# Patient Record
Sex: FEMALE | Race: WHITE | NOT HISPANIC OR LATINO | URBAN - METROPOLITAN AREA
[De-identification: names, ages, dates, MRNs, and addresses within clinical notes are randomized per-mention and may not be internally consistent; named-entity substitution may affect disease eponyms.]

---

## 2019-02-21 ENCOUNTER — INPATIENT (INPATIENT)
Facility: HOSPITAL | Age: 79
LOS: 1 days | Discharge: HOME | End: 2019-02-23
Attending: INTERNAL MEDICINE | Admitting: INTERNAL MEDICINE

## 2019-02-21 VITALS
OXYGEN SATURATION: 96 % | SYSTOLIC BLOOD PRESSURE: 131 MMHG | TEMPERATURE: 97 F | DIASTOLIC BLOOD PRESSURE: 67 MMHG | RESPIRATION RATE: 20 BRPM | HEART RATE: 64 BPM

## 2019-02-21 DIAGNOSIS — Z98.890 OTHER SPECIFIED POSTPROCEDURAL STATES: Chronic | ICD-10-CM

## 2019-02-21 LAB
ALBUMIN SERPL ELPH-MCNC: 4.5 G/DL — SIGNIFICANT CHANGE UP (ref 3.5–5.2)
ALP SERPL-CCNC: 77 U/L — SIGNIFICANT CHANGE UP (ref 30–115)
ALT FLD-CCNC: 16 U/L — SIGNIFICANT CHANGE UP (ref 0–41)
ANION GAP SERPL CALC-SCNC: 16 MMOL/L — HIGH (ref 7–14)
APPEARANCE UR: ABNORMAL
AST SERPL-CCNC: 18 U/L — SIGNIFICANT CHANGE UP (ref 0–41)
BACTERIA # UR AUTO: ABNORMAL /HPF
BASOPHILS # BLD AUTO: 0.03 K/UL — SIGNIFICANT CHANGE UP (ref 0–0.2)
BASOPHILS NFR BLD AUTO: 0.3 % — SIGNIFICANT CHANGE UP (ref 0–1)
BILIRUB SERPL-MCNC: 1.4 MG/DL — HIGH (ref 0.2–1.2)
BILIRUB UR-MCNC: NEGATIVE — SIGNIFICANT CHANGE UP
BUN SERPL-MCNC: 15 MG/DL — SIGNIFICANT CHANGE UP (ref 10–20)
CALCIUM SERPL-MCNC: 10.2 MG/DL — HIGH (ref 8.5–10.1)
CHLORIDE SERPL-SCNC: 98 MMOL/L — SIGNIFICANT CHANGE UP (ref 98–110)
CO2 SERPL-SCNC: 22 MMOL/L — SIGNIFICANT CHANGE UP (ref 17–32)
COLOR SPEC: ABNORMAL
CREAT SERPL-MCNC: 1.4 MG/DL — SIGNIFICANT CHANGE UP (ref 0.7–1.5)
DIFF PNL FLD: ABNORMAL
EOSINOPHIL # BLD AUTO: 0.02 K/UL — SIGNIFICANT CHANGE UP (ref 0–0.7)
EOSINOPHIL NFR BLD AUTO: 0.2 % — SIGNIFICANT CHANGE UP (ref 0–8)
EPI CELLS # UR: ABNORMAL /HPF
GLUCOSE SERPL-MCNC: 115 MG/DL — HIGH (ref 70–99)
GLUCOSE UR QL: NEGATIVE MG/DL — SIGNIFICANT CHANGE UP
HCT VFR BLD CALC: 40.3 % — SIGNIFICANT CHANGE UP (ref 37–47)
HGB BLD-MCNC: 13.8 G/DL — SIGNIFICANT CHANGE UP (ref 12–16)
IMM GRANULOCYTES NFR BLD AUTO: 0.5 % — HIGH (ref 0.1–0.3)
KETONES UR-MCNC: NEGATIVE — SIGNIFICANT CHANGE UP
LACTATE SERPL-SCNC: 1.6 MMOL/L — SIGNIFICANT CHANGE UP (ref 0.5–2.2)
LEUKOCYTE ESTERASE UR-ACNC: ABNORMAL
LIDOCAIN IGE QN: 138 U/L — HIGH (ref 7–60)
LYMPHOCYTES # BLD AUTO: 0.71 K/UL — LOW (ref 1.2–3.4)
LYMPHOCYTES # BLD AUTO: 6.9 % — LOW (ref 20.5–51.1)
MAGNESIUM SERPL-MCNC: 2 MG/DL — SIGNIFICANT CHANGE UP (ref 1.8–2.4)
MCHC RBC-ENTMCNC: 27.4 PG — SIGNIFICANT CHANGE UP (ref 27–31)
MCHC RBC-ENTMCNC: 34.2 G/DL — SIGNIFICANT CHANGE UP (ref 32–37)
MCV RBC AUTO: 80.1 FL — LOW (ref 81–99)
MONOCYTES # BLD AUTO: 0.51 K/UL — SIGNIFICANT CHANGE UP (ref 0.1–0.6)
MONOCYTES NFR BLD AUTO: 4.9 % — SIGNIFICANT CHANGE UP (ref 1.7–9.3)
NEUTROPHILS # BLD AUTO: 9.03 K/UL — HIGH (ref 1.4–6.5)
NEUTROPHILS NFR BLD AUTO: 87.2 % — HIGH (ref 42.2–75.2)
NITRITE UR-MCNC: POSITIVE
NRBC # BLD: 0 /100 WBCS — SIGNIFICANT CHANGE UP (ref 0–0)
PH UR: 7 — SIGNIFICANT CHANGE UP (ref 5–8)
PLATELET # BLD AUTO: 185 K/UL — SIGNIFICANT CHANGE UP (ref 130–400)
POTASSIUM SERPL-MCNC: 3.9 MMOL/L — SIGNIFICANT CHANGE UP (ref 3.5–5)
POTASSIUM SERPL-SCNC: 3.9 MMOL/L — SIGNIFICANT CHANGE UP (ref 3.5–5)
PROT SERPL-MCNC: 7.2 G/DL — SIGNIFICANT CHANGE UP (ref 6–8)
PROT UR-MCNC: ABNORMAL MG/DL
RBC # BLD: 5.03 M/UL — SIGNIFICANT CHANGE UP (ref 4.2–5.4)
RBC # FLD: 12.9 % — SIGNIFICANT CHANGE UP (ref 11.5–14.5)
RBC CASTS # UR COMP ASSIST: ABNORMAL /HPF
SODIUM SERPL-SCNC: 136 MMOL/L — SIGNIFICANT CHANGE UP (ref 135–146)
SP GR SPEC: 1.01 — SIGNIFICANT CHANGE UP (ref 1.01–1.03)
UROBILINOGEN FLD QL: 1 MG/DL (ref 0.2–0.2)
WBC # BLD: 10.35 K/UL — SIGNIFICANT CHANGE UP (ref 4.8–10.8)
WBC # FLD AUTO: 10.35 K/UL — SIGNIFICANT CHANGE UP (ref 4.8–10.8)
WBC UR QL: ABNORMAL /HPF

## 2019-02-21 RX ORDER — SODIUM CHLORIDE 9 MG/ML
1000 INJECTION INTRAMUSCULAR; INTRAVENOUS; SUBCUTANEOUS
Qty: 0 | Refills: 0 | Status: DISCONTINUED | OUTPATIENT
Start: 2019-02-21 | End: 2019-02-23

## 2019-02-21 RX ORDER — MORPHINE SULFATE 50 MG/1
2 CAPSULE, EXTENDED RELEASE ORAL EVERY 4 HOURS
Qty: 0 | Refills: 0 | Status: DISCONTINUED | OUTPATIENT
Start: 2019-02-21 | End: 2019-02-23

## 2019-02-21 RX ORDER — ACETAMINOPHEN 500 MG
650 TABLET ORAL ONCE
Qty: 0 | Refills: 0 | Status: COMPLETED | OUTPATIENT
Start: 2019-02-21 | End: 2019-02-21

## 2019-02-21 RX ORDER — ONDANSETRON 8 MG/1
4 TABLET, FILM COATED ORAL EVERY 6 HOURS
Qty: 0 | Refills: 0 | Status: DISCONTINUED | OUTPATIENT
Start: 2019-02-21 | End: 2019-02-23

## 2019-02-21 RX ORDER — TAMSULOSIN HYDROCHLORIDE 0.4 MG/1
0.4 CAPSULE ORAL AT BEDTIME
Qty: 0 | Refills: 0 | Status: DISCONTINUED | OUTPATIENT
Start: 2019-02-21 | End: 2019-02-23

## 2019-02-21 RX ORDER — CHLORHEXIDINE GLUCONATE 213 G/1000ML
1 SOLUTION TOPICAL
Qty: 0 | Refills: 0 | Status: DISCONTINUED | OUTPATIENT
Start: 2019-02-21 | End: 2019-02-23

## 2019-02-21 RX ORDER — ACETAMINOPHEN 500 MG
650 TABLET ORAL EVERY 6 HOURS
Qty: 0 | Refills: 0 | Status: DISCONTINUED | OUTPATIENT
Start: 2019-02-21 | End: 2019-02-23

## 2019-02-21 RX ORDER — SODIUM CHLORIDE 9 MG/ML
1000 INJECTION, SOLUTION INTRAVENOUS ONCE
Qty: 0 | Refills: 0 | Status: COMPLETED | OUTPATIENT
Start: 2019-02-21 | End: 2019-02-21

## 2019-02-21 RX ORDER — HEPARIN SODIUM 5000 [USP'U]/ML
5000 INJECTION INTRAVENOUS; SUBCUTANEOUS EVERY 8 HOURS
Qty: 0 | Refills: 0 | Status: DISCONTINUED | OUTPATIENT
Start: 2019-02-21 | End: 2019-02-23

## 2019-02-21 RX ORDER — CEFTRIAXONE 500 MG/1
1 INJECTION, POWDER, FOR SOLUTION INTRAMUSCULAR; INTRAVENOUS EVERY 24 HOURS
Qty: 0 | Refills: 0 | Status: DISCONTINUED | OUTPATIENT
Start: 2019-02-21 | End: 2019-02-23

## 2019-02-21 RX ORDER — CEFTRIAXONE 500 MG/1
1 INJECTION, POWDER, FOR SOLUTION INTRAMUSCULAR; INTRAVENOUS ONCE
Qty: 0 | Refills: 0 | Status: COMPLETED | OUTPATIENT
Start: 2019-02-21 | End: 2019-02-21

## 2019-02-21 RX ADMIN — Medication 650 MILLIGRAM(S): at 19:02

## 2019-02-21 RX ADMIN — SODIUM CHLORIDE 2000 MILLILITER(S): 9 INJECTION, SOLUTION INTRAVENOUS at 19:02

## 2019-02-21 RX ADMIN — SODIUM CHLORIDE 1000 MILLILITER(S): 9 INJECTION, SOLUTION INTRAVENOUS at 20:21

## 2019-02-21 RX ADMIN — Medication 650 MILLIGRAM(S): at 20:21

## 2019-02-21 RX ADMIN — CEFTRIAXONE 100 GRAM(S): 500 INJECTION, POWDER, FOR SOLUTION INTRAMUSCULAR; INTRAVENOUS at 20:21

## 2019-02-21 NOTE — H&P ADULT - NSHPREVIEWOFSYSTEMS_GEN_ALL_CORE
REVIEW OF SYSTEMS:    CONSTITUTIONAL: No weakness or fevers  EYES/ENT: No visual changes  NECK: No pain or stiffness  RESPIRATORY: No cough, wheezing, hemoptysis; No shortness of breath  CARDIOVASCULAR: No chest pain or palpitations, no lower extremity edema  GASTROINTESTINAL: see HPI  GENITOURINARY: See hpi  NEUROLOGICAL: No numbness or weakness  SKIN: No itching, rashes

## 2019-02-21 NOTE — ED PROVIDER NOTE - ATTENDING CONTRIBUTION TO CARE
Pt is a 77yo female with nonradiating R flank pain that began yesterday but worsened today.  It is constant and occasionally worsens for 15min episodes.  Vomited once today.  No fever.  [place] [facility] [place] and [place] told [place] had a UTI and given Cipro and sent to ED.  No other complaints.    Exam: soft NT abdomen, R CVA tenderness, no rash, NAD, cap refill <2s  Imp: r/o stone or pyelo  Plan: labs, ua, ct

## 2019-02-21 NOTE — ED PROVIDER NOTE - PROGRESS NOTE DETAILS
Discussed with Kristel urology PA. Said patient cannot be discharged with positive urine and stone. Recommending admission to medicine. Does not believe she will be taken to OR tomorrow, but may need a stent at some point in the future depending on how stone and clinical picture is progressing.

## 2019-02-21 NOTE — H&P ADULT - ATTENDING COMMENTS
HPI as above.  Interval history: Pt denies any cp or sob, n/v, or pain  Vital Signs (24 Hrs):  T(C): 36.6 (02-22-19 @ 13:02), Max: 36.8 (02-22-19 @ 00:40)  HR: 80 (02-22-19 @ 13:02) (64 - 84)  BP: 130/65 (02-22-19 @ 13:02) (107/67 - 143/69)  RR: 18 (02-22-19 @ 13:02) (18 - 20)  SpO2: 95% (02-22-19 @ 00:40) (95% - 97%)  Wt(kg): --  Daily Height in cm: 157.48 (22 Feb 2019 06:30)    Daily     I&O's Summary    Exam: stable, no CVA tenderness   Labs reviewed  Imaging reviewed  < from: CT Abdomen and Pelvis No Cont (02.21.19 @ 19:06) >    IMPRESSION:    1.  Obstructing right proximal ureter calculus, 6.5 x 5.5 mm with   Hounsfield unit of 1156.  2.  Mild right hydronephrosis.    EKG reviewed    Plan  #Nephrolithiasis- fu urology, continue CTX, fu urine clx, pain control PRN  #ELIZABETH on CKD-base 0.9-->1.5, getting worse - send urine lytes, likely post obstructive given stone, continue fluids, DW urology, they will dw attending regarding possible intervention     #Progress Note Handoff  Pending (specify):  Consults_________, Tests________, Test Results_______, Other_________  Family discussion:  Disposition: Home___/SNF___/Other________/Unknown at this time________ HPI as above.  Interval history: Pt denies any cp or sob, n/v, or pain  Vital Signs (24 Hrs):  T(C): 36.6 (02-22-19 @ 13:02), Max: 36.8 (02-22-19 @ 00:40)  HR: 80 (02-22-19 @ 13:02) (64 - 84)  BP: 130/65 (02-22-19 @ 13:02) (107/67 - 143/69)  RR: 18 (02-22-19 @ 13:02) (18 - 20)  SpO2: 95% (02-22-19 @ 00:40) (95% - 97%)  Wt(kg): --  Daily Height in cm: 157.48 (22 Feb 2019 06:30)    Daily     I&O's Summary    Exam: stable, no CVA tenderness   Labs reviewed  Imaging reviewed  < from: CT Abdomen and Pelvis No Cont (02.21.19 @ 19:06) >    IMPRESSION:    1.  Obstructing right proximal ureter calculus, 6.5 x 5.5 mm with   Hounsfield unit of 1156.  2.  Mild right hydronephrosis.    EKG reviewed    Plan  #Nephrolithiasis- fu urology, continue CTX, fu urine clx, pain control PRN  #ELIZABETH on CKD 3a-base 0.9-->1.5, getting worse - send urine lytes, likely post obstructive given stone, continue fluids, DW urology, they will dw attending regarding possible intervention  #Elevated tbili- chronically elevated in past, CT scan neg for etioogy, continue to trend, INR stable, AST/ALT stable     #Progress Note Handoff  Pending (specify):  Consults__Urology follow up possible intervention today, pt npo_______, Tests________, Test Results_______, Other_________  Family discussion: dw pt at bedside.   Disposition: Home___/SNF___/Other________/Unknown at this time______x__

## 2019-02-21 NOTE — ED PROVIDER NOTE - OBJECTIVE STATEMENT
78 y f pmh R flank pain. Flank pain since yesterday, worsened today. Constant, radiating down towards RLQ. 1 episode nbnb vomiting today in ED. Seen at [facility] today and given cipro for UTI but told to come to ED. Denies fever, chills, nausea, cp, sob, headache, urinary sx.

## 2019-02-21 NOTE — H&P ADULT - NSHPPHYSICALEXAM_GEN_ALL_CORE
Vital Signs Last 24 Hrs  T(C): 36.2 (21 Feb 2019 18:14), Max: 36.2 (21 Feb 2019 18:14)  T(F): 97.1 (21 Feb 2019 18:14), Max: 97.1 (21 Feb 2019 18:14)  HR: 74 (21 Feb 2019 18:14) (64 - 74)  BP: 107/67 (21 Feb 2019 18:14) (107/67 - 131/67)  BP(mean): --  ABP: --  ABP(mean): --  RR: 20 (21 Feb 2019 18:14) (20 - 20)  SpO2: 97% (21 Feb 2019 18:14) (96% - 97%) Vital Signs Last 24 Hrs  T(C): 36.2 (21 Feb 2019 18:14), Max: 36.2 (21 Feb 2019 18:14)  T(F): 97.1 (21 Feb 2019 18:14), Max: 97.1 (21 Feb 2019 18:14)  HR: 74 (21 Feb 2019 18:14) (64 - 74)  BP: 107/67 (21 Feb 2019 18:14) (107/67 - 131/67)  RR: 20 (21 Feb 2019 18:14) (20 - 20)  SpO2: 97% (21 Feb 2019 18:14) (96% - 97%)    PHYSICAL EXAM:  GENERAL: NAD, speaks in full sentences, no signs of respiratory distress  HEAD:  Atraumatic, Normocephalic  EYES: EOMI, non-icteric, no injected sclera  NECK: Supple  CHEST/LUNG: Clear to auscultation bilaterally; No wheeze; No crackles; No accessory muscles used  HEART: Regular rate and rhythm; No murmurs;   ABDOMEN: Soft, Nontender, Nondistended; Bowel sounds present; No guarding; non-rigid; no CVA tenderness  EXTREMITIES:  no LE edema, moves all ext  PSYCH: AAOx3  NEUROLOGY: non-focal  SKIN: No rashes or lesions

## 2019-02-21 NOTE — H&P ADULT - ASSESSMENT
CT A/P - 1.  Obstructing right proximal ureter calculus, 6.5 x 5.5 mm with Hounsfield unit of 1156. 2.  Mild right hydronephrosis.    # Right Nephrolithiasis (6.5 x 5.5 mm) & Mild Rt Hydronephrosis + UA & ELIZABETH on CKD3a (Cr baseline 0.94 11/2018)  - No Leukocytosis, No fever, No lactic acidosis - sepsis r/o on admission  - Urology following  - IV fluid hydration  - PRN analgesics  - Flomax  - IV Abx  - F/u Ur + Bld Cx    CHG  DVT ppx 78/F CKD 3a? presents to ED with right flank pain - found to haev +UA & Right Nephrolithiasis (6.5 x 5.5 mm) & Mild Rt Hydronephrosis. Admit to medicine.    CT A/P - 1.  Obstructing right proximal ureter calculus, 6.5 x 5.5 mm with Hounsfield unit of 1156. 2.  Mild right hydronephrosis.    # Right Nephrolithiasis (6.5 x 5.5 mm) & Mild Rt Hydronephrosis + UA & ELIZABETH on CKD3a (Cr baseline 0.94 11/2018)  - No Leukocytosis, No fever, No lactic acidosis - sepsis r/o on admission  - No hx renal stone or known hx of fmaily with renal stone; no fad diets; no hypercalcemia (corrected for albumin 4.5); endorses poor fluid intake (at baseline)  - Urology following  - IV fluid hydration  - PRN analgesics - avoid NSAIDs in view of ELIZABETH, pt refused to take morphine - improved with tylenol, will c/w tylenol PRN  - Flomax  - IV Abx  - F/u Ur + Bld Cx  - Urine studies  - PRN zofran  - Counceled on driniking 2L fluid per day    # HTN - pt on norvasc 10mg; hold for now, if BP elevated restart  # Lipase - 130s, no evidence of acute pancreatitis based on hx + physical exam; Pancreas unremarkable on CT a/p    CHG  DVT ppx  DASH diet  OOB

## 2019-02-21 NOTE — CONSULT NOTE ADULT - ASSESSMENT
79 yo female with right renal colic, stone is proximal and ua + for nitrites and leukocytes, pt non toxic and afebrile    plan  -admit to medicine   -abx   -ucx  -IVF   -Flomax  -analgesia  -antiemetics     If pt develops intractable pain or fever/chills, pt may need ureteral stent    case d/w Dr. Barbosa

## 2019-02-21 NOTE — ED ADULT NURSE NOTE - NSIMPLEMENTINTERV_GEN_ALL_ED
Implemented All Fall with Harm Risk Interventions:  [place] to call system. Call bell, personal items and telephone within reach. Instruct patient to call for assistance. Room bathroom lighting operational. Non-slip footwear when patient is off stretcher. Physically safe environment: no spills, clutter or unnecessary equipment. Stretcher in lowest position, wheels locked, appropriate side rails in place. Provide visual cue, wrist band, yellow gown, etc. Monitor gait and stability. Monitor for mental status changes and reorient to person, place, and time. Review medications for side effects contributing to fall risk. Reinforce activity limits and safety measures with patient and family. Provide visual clues: red socks.

## 2019-02-21 NOTE — H&P ADULT - HISTORY OF PRESENT ILLNESS
78/F presents to ED with right flank pain.  She went to urgent care today and  was given ciprofloxacin for possible UTI and was advised to go to ED for further evaluation.     Rt flank pain started 1 day PTP, but worse today. The pain is constant, w/radiation down towards her right lower abdomen.    She had 1 episode nbnb vomiting while in ED. 78/F presents to ED with right flank pain. She went to urgent care today and was given ciprofloxacin for possible UTI. She then called her PMD today bc the pain to her right flank was getting worse and was advised to go to ED for further evaluation.     Rt flank pain started 1 day PTP,sharp, was 9/10, constant, radiation to right groin. No provoking symptoms. Took 1 aspirin at home - did not help. Took tylenol in ED whcih helpted. She had 1 episode nbnb vomiting while in ED.    No fever, rash, difficulty urinating, pain or burning with urination. No other OTC medication other than 1 aspirin. She took 1 dose [place] [facility] [place] No chest pain or SOB.

## 2019-02-21 NOTE — ED PROVIDER NOTE - CLINICAL SUMMARY MEDICAL DECISION MAKING FREE TEXT BOX
pt with sudden onset flank pain - noted to have UTI and given Cipro - UTI with stone - admitted for possible septic stone - no emergent surgical intervention

## 2019-02-21 NOTE — ED PROVIDER NOTE - PHYSICAL EXAMINATION
CONSTITUTIONAL: Well-developed; well-nourished; in no acute distress.   SKIN: warm, dry  HEAD: Normocephalic; atraumatic.  EYES: normal sclera and conjunctiva   ENT: No nasal discharge; airway clear.  NECK: Supple; non tender.  CARD: S1, S2 normal; no murmurs, gallops, or rubs. Regular rate and rhythm.   RESP: No wheezes, rales or rhonchi.  ABD: Soft, nondistended. R cva tenderness, RLQ tenderness with no rebound/guarding.   EXT: Normal ROM.  No clubbing, cyanosis or edema.   LYMPH: No acute cervical adenopathy.  NEURO: Alert, oriented, grossly unremarkable  PSYCH: Cooperative, appropriate.

## 2019-02-21 NOTE — ED PROVIDER NOTE - NS ED ROS FT
Eyes:  No visual changes, eye pain or discharge.  ENMT:  No hearing changes, pain, discharge or infections. No neck pain or stiffness.  Cardiac:  No chest pain, SOB or edema.   Respiratory:  No cough or respiratory distress.  GI:  R flank pain, R sided abd pain. 1 episode nbnb vomiting. No diarrhea   :  No dysuria, frequency or burning.  MS:  No myalgia, muscle weakness, joint pain or back pain.  Neuro:  No headache or weakness.  No LOC.  Skin:  No skin rash.   Endocrine: No history of thyroid disease or diabetes.

## 2019-02-21 NOTE — CONSULT NOTE ADULT - SUBJECTIVE AND OBJECTIVE BOX
77 yo female c/o severe right flank/abdominal pain since this morning with associated nausea and vomiting. Pt went to [facility], had +ua was prescribed Cipro, which she took 1 pill so far. Pt developed severe pain again in the area of the right flank/abdomen, +n/v, pt went to [facility] to see her PMD Dr. Mccoy, he sent her to ED for CT a/p to r/o appendicitis or renal colic. Pt was found to have a [id number] right proximal stone with mild hydronephrosis, 79 yo female c/o severe right flank/abdominal pain since this morning with associated nausea and vomiting. Pt went to [facility], had +ua was prescribed Cipro, which she took 1 pill so far. Pt developed severe pain again in the area of the right flank/abdomen, +n/v, pt went to [facility] to see her PMD Dr. Mccoy, he sent her to ED for CT a/p to r/o appendicitis or renal colic. Pt was found to have a [id number] right proximal stone with mild hydronephrosis, although afebrile, pt has nitrite and leukocyte positive urinalysis    PAST MEDICAL & SURGICAL HISTORY:  HTN    Home Medications:  Norvasc    Allergies    No Known Allergies    Intolerances    SHx - NC    FHx - NC    Vital Signs Last 24 Hrs  T(C): 36.2 (2019 18:14), Max: 36.2 (2019 18:14)  T(F): 97.1 (2019 18:14), Max: 97.1 (2019 18:14)  HR: 74 (2019 18:14) (64 - 74)  BP: 107/67 (2019 18:14) (107/67 - 131/67)  RR: 20 (2019 18:14) (20 - 20)  < from: CT Abdomen and Pelvis No Cont (19 @ 19:06) >  IMPRESSION:    1.  Obstructing right proximal ureter calculus, 6.5 x 5.5 mm with   Hounsfield unit of 1156.  2.  Mild right hydronephrosis.        < end of copied text >  SpO2: 97% (2019 18:14) (96% - 97%)    pt seen and examined at bedside  a+ox3, nad, non toxic  abd - soft, nd, +rlq ttp, no palpable bladder  gu- +right cvat, no Joseph                          13.8   10.35 )-----------( 185      ( 2019 17:05 )             40.3         136  |  98  |  15  ----------------------------<  115<H>  3.9   |  22  |  1.4    Ca    10.2<H>      2019 17:05  Mg     2.0         TPro  7.2  /  Alb  4.5  /  TBili  1.4<H>  /  DBili  x   /  AST  18  /  ALT  16  /  AlkPhos  77      Urinalysis Basic - ( 2019 19:00 )    Color: Orange / Appearance: Cloudy / S.010 / pH: x  Gluc: x / Ketone: Negative  / Bili: Negative / Urobili: 1.0 mg/dL   Blood: x / Protein: Trace mg/dL / Nitrite: Positive   Leuk Esterase: Moderate / RBC: 26-50 /HPF / WBC 26-50 /HPF   Sq Epi: x / Non Sq Epi: Many /HPF / Bacteria: Few /HPF    < from: CT Abdomen and Pelvis No Cont (19 @ 19:06) >  IMPRESSION:    1.  Obstructing right proximal ureter calculus, 6.5 x 5.5 mm with   Hounsfield unit of 1156.  2.  Mild right hydronephrosis.

## 2019-02-21 NOTE — H&P ADULT - NSHPLABSRESULTS_GEN_ALL_CORE
13.8   10.35 )-----------( 185      ( 2019 17:05 )             40.3       Hemoglobin: 13.8 g/dL ( @ 17:05)          136  |  98  |  15  ----------------------------<  115<H>  3.9   |  22  |  1.4    Ca    10.2<H>      2019 17:05  Mg     2.0         TPro  7.2  /  Alb  4.5  /  TBili  1.4<H>  /  DBili  x   /  AST  18  /  ALT  16  /  AlkPhos  77          Creatinine Trend: 1.4<--  eGFR if Non African American: 36 mL/min/1.73M2 (19 @ 17:05)  eGFR if African American: 42 mL/min/1.73M2 (19 @ 17:05)    Urinalysis Basic - ( 2019 19:00 )    Color: Orange / Appearance: Cloudy / S.010 / pH: x  Gluc: x / Ketone: Negative  / Bili: Negative / Urobili: 1.0 mg/dL   Blood: x / Protein: Trace mg/dL / Nitrite: Positive   Leuk Esterase: Moderate / RBC: 26-50 /HPF / WBC 26-50 /HPF   Sq Epi: x / Non Sq Epi: Many /HPF / Bacteria: Few /HPF    Hemoglobin A1C         Lactate Trend   @ 17:05 Lactate:1.6

## 2019-02-22 DIAGNOSIS — N20.0 CALCULUS OF KIDNEY: ICD-10-CM

## 2019-02-22 LAB
ALBUMIN SERPL ELPH-MCNC: 4.5 G/DL — SIGNIFICANT CHANGE UP (ref 3.5–5.2)
ALP SERPL-CCNC: 76 U/L — SIGNIFICANT CHANGE UP (ref 30–115)
ALT FLD-CCNC: 15 U/L — SIGNIFICANT CHANGE UP (ref 0–41)
ANION GAP SERPL CALC-SCNC: 12 MMOL/L — SIGNIFICANT CHANGE UP (ref 7–14)
APTT BLD: 31.1 SEC — SIGNIFICANT CHANGE UP (ref 27–39.2)
AST SERPL-CCNC: 15 U/L — SIGNIFICANT CHANGE UP (ref 0–41)
BILIRUB SERPL-MCNC: 2 MG/DL — HIGH (ref 0.2–1.2)
BUN SERPL-MCNC: 14 MG/DL — SIGNIFICANT CHANGE UP (ref 10–20)
CALCIUM SERPL-MCNC: 9.7 MG/DL — SIGNIFICANT CHANGE UP (ref 8.5–10.1)
CHLORIDE SERPL-SCNC: 106 MMOL/L — SIGNIFICANT CHANGE UP (ref 98–110)
CO2 SERPL-SCNC: 24 MMOL/L — SIGNIFICANT CHANGE UP (ref 17–32)
CREAT SERPL-MCNC: 1.5 MG/DL — SIGNIFICANT CHANGE UP (ref 0.7–1.5)
GLUCOSE SERPL-MCNC: 97 MG/DL — SIGNIFICANT CHANGE UP (ref 70–99)
HCT VFR BLD CALC: 41.2 % — SIGNIFICANT CHANGE UP (ref 37–47)
HGB BLD-MCNC: 13.7 G/DL — SIGNIFICANT CHANGE UP (ref 12–16)
INR BLD: 1.04 RATIO — SIGNIFICANT CHANGE UP (ref 0.65–1.3)
MAGNESIUM SERPL-MCNC: 2.3 MG/DL — SIGNIFICANT CHANGE UP (ref 1.8–2.4)
MCHC RBC-ENTMCNC: 27.1 PG — SIGNIFICANT CHANGE UP (ref 27–31)
MCHC RBC-ENTMCNC: 33.3 G/DL — SIGNIFICANT CHANGE UP (ref 32–37)
MCV RBC AUTO: 81.4 FL — SIGNIFICANT CHANGE UP (ref 81–99)
NRBC # BLD: 0 /100 WBCS — SIGNIFICANT CHANGE UP (ref 0–0)
PHOSPHATE SERPL-MCNC: 3.6 MG/DL — SIGNIFICANT CHANGE UP (ref 2.1–4.9)
PLATELET # BLD AUTO: 173 K/UL — SIGNIFICANT CHANGE UP (ref 130–400)
POTASSIUM SERPL-MCNC: 3.7 MMOL/L — SIGNIFICANT CHANGE UP (ref 3.5–5)
POTASSIUM SERPL-SCNC: 3.7 MMOL/L — SIGNIFICANT CHANGE UP (ref 3.5–5)
PROT SERPL-MCNC: 7.2 G/DL — SIGNIFICANT CHANGE UP (ref 6–8)
PROTHROM AB SERPL-ACNC: 12 SEC — SIGNIFICANT CHANGE UP (ref 9.95–12.87)
RBC # BLD: 5.06 M/UL — SIGNIFICANT CHANGE UP (ref 4.2–5.4)
RBC # FLD: 13.2 % — SIGNIFICANT CHANGE UP (ref 11.5–14.5)
SODIUM SERPL-SCNC: 142 MMOL/L — SIGNIFICANT CHANGE UP (ref 135–146)
TYPE + AB SCN PNL BLD: SIGNIFICANT CHANGE UP
WBC # BLD: 5.23 K/UL — SIGNIFICANT CHANGE UP (ref 4.8–10.8)
WBC # FLD AUTO: 5.23 K/UL — SIGNIFICANT CHANGE UP (ref 4.8–10.8)

## 2019-02-22 RX ORDER — AMLODIPINE BESYLATE 2.5 MG/1
5 TABLET ORAL DAILY
Qty: 0 | Refills: 0 | Status: DISCONTINUED | OUTPATIENT
Start: 2019-02-22 | End: 2019-02-23

## 2019-02-22 RX ADMIN — Medication 650 MILLIGRAM(S): at 00:32

## 2019-02-22 RX ADMIN — Medication 650 MILLIGRAM(S): at 00:02

## 2019-02-22 RX ADMIN — CEFTRIAXONE 100 GRAM(S): 500 INJECTION, POWDER, FOR SOLUTION INTRAMUSCULAR; INTRAVENOUS at 08:58

## 2019-02-22 RX ADMIN — SODIUM CHLORIDE 150 MILLILITER(S): 9 INJECTION INTRAMUSCULAR; INTRAVENOUS; SUBCUTANEOUS at 01:00

## 2019-02-22 RX ADMIN — AMLODIPINE BESYLATE 5 MILLIGRAM(S): 2.5 TABLET ORAL at 11:26

## 2019-02-22 RX ADMIN — SODIUM CHLORIDE 150 MILLILITER(S): 9 INJECTION INTRAMUSCULAR; INTRAVENOUS; SUBCUTANEOUS at 08:58

## 2019-02-22 RX ADMIN — SODIUM CHLORIDE 150 MILLILITER(S): 9 INJECTION INTRAMUSCULAR; INTRAVENOUS; SUBCUTANEOUS at 21:22

## 2019-02-22 RX ADMIN — TAMSULOSIN HYDROCHLORIDE 0.4 MILLIGRAM(S): 0.4 CAPSULE ORAL at 21:23

## 2019-02-22 NOTE — PROGRESS NOTE ADULT - ASSESSMENT
79 y/o Female with right mild hydronephrosis 2/2  to a 5x5x6.5 proximal ureteral stone. Pt doing better this am.  No pain today.    A) right mild hydronephrosis 2/2 to a 5x5x6.5 proximal  ureteral stone  Non clean catch urinalysis  UTI    P) continue conservative management for now.  Flomax, pain control  strain all urine, IV hydration.  check culture, trend creatine.  will d/w attending

## 2019-02-22 NOTE — PROGRESS NOTE ADULT - PROBLEM SELECTOR PLAN 1
Patient seen.  Totally asymptomatic.  Did not pass stone yet.  I offered her options including cystoscopy with ureteroscopy, expectant management and ESWL.  She wants to undergo ESWL as outpatient.  May discharge on tamsulosin and pain medications.  Will schedule as outpatient.  Avoid ASA, NSAIDs etc.

## 2019-02-22 NOTE — PROGRESS NOTE ADULT - SUBJECTIVE AND OBJECTIVE BOX
Progress Note    Subjective  77 y/o Female with right mild hydronephrosis 2/2  to a 5x5x6.5 proximal ureteral stone. Pt doing better this am.  No pain today.      Vital signs  T(C): , Max: 36.8 (02-22-19 @ 00:40)  HR: 66 (02-22-19 @ 06:30)  BP: 143/69 (02-22-19 @ 06:30)  SpO2: 95% (02-22-19 @ 00:40)    Gen in NAD  Abd soft non tender, No CVAT   voiding    Labs                        13.7   5.23  )-----------( 173      ( 22 Feb 2019 07:39 )             41.2     22 Feb 2019 07:39    142    |  106    |  14     ----------------------------<  97     3.7     |  24     |  1.5      Ca    9.7        22 Feb 2019 07:39  Phos  3.6       22 Feb 2019 07:39  Mg     2.3       22 Feb 2019 07:39

## 2019-02-22 NOTE — PROGRESS NOTE ADULT - ASSESSMENT
78/F CKD 3a? presents to ED with right flank pain - found to haev +UA & Right Nephrolithiasis (6.5 x 5.5 mm) & Mild Rt Hydronephrosis.      # Right Nephrolithiasis (6.5 x 5.5 mm) & Mild Rt Hydronephrosis with +UA & ELIZABETH on CKD3a (Cr baseline 0.94 11/2018)  - No Leukocytosis, No fever, No lactic acidosis - sepsis r/o on admission  - CT A/P - 1.  Obstructing right proximal ureter calculus, 6.5 x 5.5 mm with Hounsfield unit of 1156. 2.  Mild right hydronephrosis.  - No hx renal stone or known hx of fmaily with renal stone; no fad diets; no hypercalcemia (corrected for albumin 4.5); endorses poor fluid intake (at baseline)  - Urology following: recommend conservative management for now. will follow.  - IV fluid hydration   - PRN analgesics - avoid NSAIDs in view of ELIZABETH, pt refused to take morphine - improved with tylenol, will c/w tylenol PRN  - Flomax  - IV Rocephin   - F/u Ur + Bld Cx  - Urine studies  - PRN zofran  - Counseled on drinking 2L fluid per day    # HTN - c/w Norvasc 10mg daily  # Lipase - 130s, no evidence of acute pancreatitis based on hx + physical exam; Pancreas unremarkable on CT a/p 78/F CKD 3a? presents to ED with right flank pain - found to haev +UA & Right Nephrolithiasis (6.5 x 5.5 mm) & Mild Rt Hydronephrosis.      # Right Nephrolithiasis (6.5 x 5.5 mm) & Mild Rt Hydronephrosis with +UA & ELIZABETH on CKD3a (Cr baseline 0.94 11/2018)  - No Leukocytosis, No fever, No lactic acidosis - sepsis r/o on admission  - CT A/P - 1.  Obstructing right proximal ureter calculus, 6.5 x 5.5 mm with Hounsfield unit of 1156. 2.  Mild right hydronephrosis.  - No hx renal stone or known hx of fmaily with renal stone; no fad diets; no hypercalcemia (corrected for albumin 4.5); endorses poor fluid intake (at baseline)  - Urology following: recommend conservative management for now. will follow.  - IV fluid hydration   - PRN analgesics - avoid NSAIDs in view of ELIZABETH, pt refused to take morphine - improved with tylenol, will c/w tylenol PRN  - Flomax  - IV Rocephin for UTI  - F/u Ur + Bld Cx  - Urine studies  - PRN zofran  - Counseled on drinking 2L fluid per day    # HTN - c/w Norvasc 10mg daily  # Lipase - 130s, no evidence of acute pancreatitis based on hx + physical exam; Pancreas unremarkable on CT a/p

## 2019-02-22 NOTE — PROGRESS NOTE ADULT - SUBJECTIVE AND OBJECTIVE BOX
SUBJECTIVE:    Patient is a 78y old Female who presents with a chief complaint of Nephrolithiasis (2019 22:06)    Currently admitted to medicine with the primary diagnosis of Renal stone     Today is hospital day 1d. pt c/o mild flank pain. encouraged oral hydration.    PAST MEDICAL & SURGICAL HISTORY  Hypertension  History of back surgery: age 28 - disc herniation surgery        ALLERGIES:  No Known Allergies    MEDICATIONS:  STANDING MEDICATIONS  amLODIPine   Tablet 5 milliGRAM(s) Oral daily  cefTRIAXone   IVPB 1 Gram(s) IV Intermittent every 24 hours  chlorhexidine 2% Cloths 1 Application(s) Topical <User Schedule>  heparin  Injectable 5000 Unit(s) SubCutaneous every 8 hours  sodium chloride 0.9%. 1000 milliLiter(s) IV Continuous <Continuous>  tamsulosin 0.4 milliGRAM(s) Oral at bedtime    PRN MEDICATIONS  acetaminophen   Tablet .. 650 milliGRAM(s) Oral every 6 hours PRN  morphine  - Injectable 2 milliGRAM(s) IV Push every 4 hours PRN  ondansetron Injectable 4 milliGRAM(s) IV Push every 6 hours PRN    VITALS:   T(F): 96.3  HR: 66  BP: 143/69  RR: 18  SpO2: 95%    LABS:                        13.7   5.23  )-----------( 173      ( 2019 07:39 )             41.2     02-    142  |  106  |  14  ----------------------------<  97  3.7   |  24  |  1.5    Ca    9.7      2019 07:39  Phos  3.6     -  Mg     2.3     -    TPro  7.2  /  Alb  4.5  /  TBili  2.0<H>  /  DBili  x   /  AST  15  /  ALT  15  /  AlkPhos  76  02-22    PT/INR - ( 2019 07:39 )   PT: 12.00 sec;   INR: 1.04 ratio         PTT - ( 2019 07:39 )  PTT:31.1 sec  Urinalysis Basic - ( 2019 19:00 )    Color: Orange / Appearance: Cloudy / S.010 / pH: x  Gluc: x / Ketone: Negative  / Bili: Negative / Urobili: 1.0 mg/dL   Blood: x / Protein: Trace mg/dL / Nitrite: Positive   Leuk Esterase: Moderate / RBC: 26-50 /HPF / WBC 26-50 /HPF   Sq Epi: x / Non Sq Epi: Many /HPF / Bacteria: Few /HPF        Lactate, Blood: 1.6 mmol/L (19 @ 17:05)          RADIOLOGY:      EXAM:  CT ABDOMEN AND PELVIS            PROCEDURE DATE:  2019            INTERPRETATION:  CLINICAL STATEMENT: Right flank pain      TECHNIQUE: Contiguous axial CT images were obtained from the lower chest   to the pubic symphysis without intravenous contrast.  Oral contrast was   not administered.  Reformatted images in the coronal and sagittal planes   were acquired.    COMPARISON CT: None.    OTHER STUDIES USED FOR CORRELATION: None.     FINDINGS:    LOWER CHEST: Coronary atherosclerosis.     HEPATOBILIARY: Unremarkable.    SPLEEN: Unremarkable.    PANCREAS: Unremarkable.    ADRENAL GLANDS: Unremarkable.    KIDNEYS: Mild right hydronephrosis secondary to obstructing right   proximal ureter calculus measuring 6.5 x 5.5 mm, image #139of the series   4 with a Hounsfield units of 1156. This calculus is seen between L2 and   L3 vertebral body.    ABDOMINOPELVIC NODES: Unremarkable.    PELVIC ORGANS: Unremarkable.    PERITONEUM/MESENTERY/BOWEL: Unremarkable.    BONES/SOFT TISSUES: Multilevel degenerative changes of spine.    OTHER: Atherosclerotic changes of abdominal aorta and the branches    IMPRESSION:    1.  Obstructing right proximal ureter calculus, 6.5 x 5.5 mm with   Hounsfield unit of 1156.  2.  Mild right hydronephrosis.               DENISSE PERALTA M.D., ATTENDING RADIOLOGIST  This document has been electronically signed. 2019  7:39PM                PHYSICAL EXAM:  GEN: No acute distress  LUNGS: Clear to auscultation bilaterally   HEART: S1/S2 present. RRR.   ABD: Soft, non-tender, non-distended. Bowel sounds present  EXT: Skin Intact.   NEURO: AAOX3

## 2019-02-23 ENCOUNTER — TRANSCRIPTION ENCOUNTER (OUTPATIENT)
Age: 79
End: 2019-02-23

## 2019-02-23 VITALS
TEMPERATURE: 97 F | SYSTOLIC BLOOD PRESSURE: 128 MMHG | HEART RATE: 77 BPM | RESPIRATION RATE: 18 BRPM | DIASTOLIC BLOOD PRESSURE: 62 MMHG

## 2019-02-23 LAB
ANION GAP SERPL CALC-SCNC: 18 MMOL/L — HIGH (ref 7–14)
BASOPHILS # BLD AUTO: 0.03 K/UL — SIGNIFICANT CHANGE UP (ref 0–0.2)
BASOPHILS NFR BLD AUTO: 0.6 % — SIGNIFICANT CHANGE UP (ref 0–1)
BUN SERPL-MCNC: 17 MG/DL — SIGNIFICANT CHANGE UP (ref 10–20)
CALCIUM SERPL-MCNC: 9.1 MG/DL — SIGNIFICANT CHANGE UP (ref 8.5–10.1)
CHLORIDE SERPL-SCNC: 104 MMOL/L — SIGNIFICANT CHANGE UP (ref 98–110)
CO2 SERPL-SCNC: 22 MMOL/L — SIGNIFICANT CHANGE UP (ref 17–32)
CREAT SERPL-MCNC: 1.2 MG/DL — SIGNIFICANT CHANGE UP (ref 0.7–1.5)
CULTURE RESULTS: NO GROWTH — SIGNIFICANT CHANGE UP
EOSINOPHIL # BLD AUTO: 0.11 K/UL — SIGNIFICANT CHANGE UP (ref 0–0.7)
EOSINOPHIL NFR BLD AUTO: 2.1 % — SIGNIFICANT CHANGE UP (ref 0–8)
GLUCOSE SERPL-MCNC: 96 MG/DL — SIGNIFICANT CHANGE UP (ref 70–99)
HCT VFR BLD CALC: 38.2 % — SIGNIFICANT CHANGE UP (ref 37–47)
HGB BLD-MCNC: 12.6 G/DL — SIGNIFICANT CHANGE UP (ref 12–16)
IMM GRANULOCYTES NFR BLD AUTO: 0.4 % — HIGH (ref 0.1–0.3)
LYMPHOCYTES # BLD AUTO: 1.22 K/UL — SIGNIFICANT CHANGE UP (ref 1.2–3.4)
LYMPHOCYTES # BLD AUTO: 23.1 % — SIGNIFICANT CHANGE UP (ref 20.5–51.1)
MCHC RBC-ENTMCNC: 27.2 PG — SIGNIFICANT CHANGE UP (ref 27–31)
MCHC RBC-ENTMCNC: 33 G/DL — SIGNIFICANT CHANGE UP (ref 32–37)
MCV RBC AUTO: 82.3 FL — SIGNIFICANT CHANGE UP (ref 81–99)
MONOCYTES # BLD AUTO: 0.44 K/UL — SIGNIFICANT CHANGE UP (ref 0.1–0.6)
MONOCYTES NFR BLD AUTO: 8.3 % — SIGNIFICANT CHANGE UP (ref 1.7–9.3)
NEUTROPHILS # BLD AUTO: 3.45 K/UL — SIGNIFICANT CHANGE UP (ref 1.4–6.5)
NEUTROPHILS NFR BLD AUTO: 65.5 % — SIGNIFICANT CHANGE UP (ref 42.2–75.2)
NRBC # BLD: 0 /100 WBCS — SIGNIFICANT CHANGE UP (ref 0–0)
PLATELET # BLD AUTO: 149 K/UL — SIGNIFICANT CHANGE UP (ref 130–400)
POTASSIUM SERPL-MCNC: 3.9 MMOL/L — SIGNIFICANT CHANGE UP (ref 3.5–5)
POTASSIUM SERPL-SCNC: 3.9 MMOL/L — SIGNIFICANT CHANGE UP (ref 3.5–5)
RBC # BLD: 4.64 M/UL — SIGNIFICANT CHANGE UP (ref 4.2–5.4)
RBC # FLD: 13.3 % — SIGNIFICANT CHANGE UP (ref 11.5–14.5)
SODIUM SERPL-SCNC: 144 MMOL/L — SIGNIFICANT CHANGE UP (ref 135–146)
SPECIMEN SOURCE: SIGNIFICANT CHANGE UP
WBC # BLD: 5.27 K/UL — SIGNIFICANT CHANGE UP (ref 4.8–10.8)
WBC # FLD AUTO: 5.27 K/UL — SIGNIFICANT CHANGE UP (ref 4.8–10.8)

## 2019-02-23 RX ORDER — CIPROFLOXACIN LACTATE 400MG/40ML
1 VIAL (ML) INTRAVENOUS
Qty: 16 | Refills: 0
Start: 2019-02-23 | End: 2019-03-02

## 2019-02-23 RX ORDER — TAMSULOSIN HYDROCHLORIDE 0.4 MG/1
1 CAPSULE ORAL
Qty: 30 | Refills: 0
Start: 2019-02-23 | End: 2019-03-24

## 2019-02-23 RX ADMIN — CEFTRIAXONE 100 GRAM(S): 500 INJECTION, POWDER, FOR SOLUTION INTRAMUSCULAR; INTRAVENOUS at 08:07

## 2019-02-23 RX ADMIN — Medication 650 MILLIGRAM(S): at 05:03

## 2019-02-23 RX ADMIN — AMLODIPINE BESYLATE 5 MILLIGRAM(S): 2.5 TABLET ORAL at 05:03

## 2019-02-23 RX ADMIN — Medication 650 MILLIGRAM(S): at 12:00

## 2019-02-23 RX ADMIN — Medication 650 MILLIGRAM(S): at 06:00

## 2019-02-23 RX ADMIN — Medication 650 MILLIGRAM(S): at 11:22

## 2019-02-23 NOTE — PROGRESS NOTE ADULT - ASSESSMENT
77 y/o Female with right mild hydronephrosis 2/2  to a 5x5x6.5 proximal ureteral stone. Pt with c/o mild flank pain  this am. Pain controlled with medication. No acute events overnight.      A) right mild hydronephrosis 2/2 to a 5x5x6.5 proximal  ureteral stone  Non clean catch urinalysis  UTI  resolving ELIZABETH  negative cultures      P) Pt is urologically cleared for d/c.  d/c home with Flomax  tramadol, strainers  she will f/u as op to be scheduled for ESWL  with Dr. Barbosa. [phone]  she is aware to return to the hospital if her pain worsens   or if she has a fever.

## 2019-02-23 NOTE — DISCHARGE NOTE ADULT - CARE PLAN
Principal Discharge DX:	Renal stone  Goal:	outpatient follow up  Assessment and plan of treatment:	You came in for a  6.5 x 5.5 mm right kidney stone. We treated you with IV fluids and antibiotics. Please follow up with urology with Dr. Barbosa. [phone] to undergo ESWL as outpatient. Avoid Aspirin and NSAIDS. Return to the ED if your pain worsens. Take meds as prescribed. Principal Discharge DX:	Renal stone  Goal:	outpatient follow up  Assessment and plan of treatment:	You came in for a  6.5 x 5.5 mm right kidney stone. We treated you with IV fluids and antibiotics. Please follow up with urology with Dr. Barbosa or Dr Do. [phone] to undergo ESWL as outpatient. Avoid Aspirin and NSAIDS. Return to the ED if your pain worsens. Take meds as prescribed. Finish the antibiotics you already have for another 7 days

## 2019-02-23 NOTE — DISCHARGE NOTE ADULT - MEDICATION SUMMARY - MEDICATIONS TO TAKE
I will START or STAY ON the medications listed below when I get home from the hospital:    tamsulosin 0.4 mg oral capsule  -- 1 cap(s) by mouth once a day (at bedtime)  -- Indication: For Kidney stone    Norvasc 10 mg oral tablet  -- 1 tab(s) by mouth once a day  -- Indication: For Hypertension    ciprofloxacin 500 mg oral tablet  -- 1 tab(s) by mouth 2 times a day until March 3 2019  -- Avoid prolonged or excessive exposure to direct and/or artificial sunlight while taking this medication.  Check with your doctor before becoming pregnant.  Do not take dairy products, antacids, or iron preparations within one hour of this medication.  Finish all this medication unless otherwise directed by prescriber.  Medication should be taken with plenty of water.    -- Indication: For Kidney stone

## 2019-02-23 NOTE — DISCHARGE NOTE ADULT - REASON FOR ADMISSION
Nephrolithiasis Nephrolithiasis with Ureteral Stone 6.5mm, ELIZABETH, Outpt Ureteroscopy/Lithotripsy and Stent Placement with .

## 2019-02-23 NOTE — DISCHARGE NOTE ADULT - PATIENT PORTAL LINK FT
You can access the [facility] Patient Portal, offered by [facility], by registering with the following website: [email or web address]

## 2019-02-23 NOTE — PROGRESS NOTE ADULT - SUBJECTIVE AND OBJECTIVE BOX
Progress Note    Subjective 77 y/o Female with right mild hydronephrosis 2/2  to a 5x5x6.5 proximal ureteral stone. Pt with c/o mild flank pain  this am. Pain controlled with medication. No acute events overnight.    Vital signs  T(C): , Max: 36.6 (02-22-19 @ 13:02)  HR: 60 (02-23-19 @ 05:11)  BP: 126/61 (02-23-19 @ 05:11)      Gen in NAD  Abd + right mild CVAT   voiding    Labs                        12.6   5.27  )-----------( 149      ( 23 Feb 2019 05:12 )             38.2     23 Feb 2019 05:12    144    |  104    |  17     ----------------------------<  96     3.9     |  22     |  1.2      Ca    9.1        23 Feb 2019 05:12  Phos  3.6       22 Feb 2019 07:39  Mg     2.3       22 Feb 2019 07:39        Culture - Blood (02.22.19 @ 00:18)    Specimen Source: .Blood None    Culture Results:   No growth to date.    Culture - Urine (02.21.19 @ 16:47)    Specimen Source: .Urine Clean Catch (Midstream)    Culture Results:   No growth

## 2019-02-23 NOTE — DISCHARGE NOTE ADULT - CARE PROVIDER_API CALL
Esvin Barbosa)  Urology  [facility], [place] [address]  Phone: [phone]  Fax: [phone]  Follow Up Time: Esvin Barbosa)  Urology  [facility], [place] [address]  Phone: [phone]  Fax: [phone]  Follow Up Time:     Sherif Do)  Urology  [address] [place] [address]  Phone: [phone]  Fax: [phone]  Follow Up Time:

## 2019-02-23 NOTE — DISCHARGE NOTE ADULT - PROVIDER TOKENS
PROVIDER:[TOKEN:[[id number]]] PROVIDER:[TOKEN:[[id number]]],PROVIDER:[TOKEN:[[id number]]]

## 2019-02-23 NOTE — DISCHARGE NOTE ADULT - HOSPITAL COURSE
78/F CKD 3a? presents to ED with right flank pain - found to haev +UA & Right Nephrolithiasis (6.5 x 5.5 mm) & Mild Rt Hydronephrosis.      # Right Nephrolithiasis (6.5 x 5.5 mm) & Mild Rt Hydronephrosis with +UA & ELIZABETH on CKD3a (Cr baseline 0.94 11/2018)  - No Leukocytosis, No fever, No lactic acidosis - sepsis r/o on admission  - CT A/P - 1.  Obstructing right proximal ureter calculus, 6.5 x 5.5 mm with Hounsfield unit of 1156. 2.  Mild right hydronephrosis.  - No hx renal stone or known hx of fmaily with renal stone; no fad diets; no hypercalcemia (corrected for albumin 4.5); endorses poor fluid intake (at baseline)  - Urology following: outpatient follow up for stent  - IV fluid hydration   - PRN analgesics - avoid NSAIDs in view of ELIZABETH, pt refused to take morphine - improved with tylenol, will c/w tylenol PRN  - Flomax  - IV Rocephin for UTI  - Cultures are negative.  - Urine studies  - PRN zofran  - Counseled on drinking 2L fluid per day    # HTN - c/w Norvasc 10mg daily 78/F CKD 3a? presents to ED with right flank pain - found to haev +UA & Right Nephrolithiasis (6.5 x 5.5 mm) & Mild Rt Hydronephrosis.      # Right Nephrolithiasis (6.5 x 5.5 mm) & Mild Rt Hydronephrosis with +UA & ELIZABETH on CKD3a (Cr baseline 0.94 11/2018)  - No Leukocytosis, No fever, No lactic acidosis - sepsis r/o on admission  - CT A/P - 1.  Obstructing right proximal ureter calculus, 6.5 x 5.5 mm with Hounsfield unit of 1156. 2.  Mild right hydronephrosis.  - No hx renal stone or known hx of fmaily with renal stone; no fad diets; no hypercalcemia (corrected for albumin 4.5); endorses poor fluid intake (at baseline)  - Urology following: outpatient follow up for stent  - IV fluid hydration   - PRN analgesics - avoid NSAIDs in view of ELIZABETH, pt refused to take morphine - improved with tylenol, will c/w tylenol PRN  - Flomax  - IV Rocephin for UTI  - Cultures are negative.  - Urine studies  - PRN zofran  - Counseled on drinking 2L fluid per day    # HTN - c/w Norvasc 10mg daily    Vital Signs Last 24 Hrs  T(C): 36 (23 Feb 2019 12:33), Max: 36.6 (22 Feb 2019 13:02)  T(F): 96.8 (23 Feb 2019 12:33), Max: 97.8 (22 Feb 2019 13:02)  HR: 77 (23 Feb 2019 12:33) (60 - 80)  BP: 128/62 (23 Feb 2019 12:33) (119/65 - 130/65)  RR: 18 (23 Feb 2019 12:33) (18 - 18)    PHYSICAL EXAM:  GENERAL: NAD, well-developed  HEAD:  Atraumatic, Normocephalic  EYES: EOMI, PERRLA, conjunctiva and sclera clear  NECK: Supple, No JVD  CHEST/LUNG: Clear to auscultation bilaterally; No wheeze  HEART: Regular rate and rhythm; No murmurs, rubs, or gallops  ABDOMEN: Soft, Nondistended; Bowel sounds present, +Right flank CVA   EXTREMITIES:  2+ Peripheral Pulses, No clubbing, cyanosis, or edema  PSYCH: AAOx3  NEUROLOGY: non-focal  SKIN: No rashes or lesions                          12.6   5.27  )-----------( 149      ( 23 Feb 2019 05:12 )             38.2     02-23    144  |  104  |  17  ----------------------------<  96  3.9   |  22  |  1.2    Ca    9.1      23 Feb 2019 05:12  Phos  3.6     02-22  Mg     2.3     02-22    TPro  7.2  /  Alb  4.5  /  TBili  2.0<H>  /  DBili  x   /  AST  15  /  ALT  15  /  AlkPhos  76  02-22    Meds: PEr medrec. Also instructed to resume cipro 500mg po bid x 7more days. f/u with  for stent,lithotripsy.

## 2019-02-23 NOTE — PROGRESS NOTE ADULT - REASON FOR ADMISSION
Nephrolithiasis 65yo female with nausea/vomting and weight loss  pt admitted with dehydration and worsening of subacute symptoms  plan for egd tomorrow  ok for clears today  PPI  ct scan reviewed

## 2019-02-23 NOTE — DISCHARGE NOTE ADULT - PLAN OF CARE
outpatient follow up You came in for a  6.5 x 5.5 mm right kidney stone. We treated you with IV fluids and antibiotics. Please follow up with urology with Dr. Barbosa. [phone] to undergo ESWL as outpatient. Avoid Aspirin and NSAIDS. Return to the ED if your pain worsens. Take meds as prescribed. You came in for a  6.5 x 5.5 mm right kidney stone. We treated you with IV fluids and antibiotics. Please follow up with urology with Dr. Barbosa or Dr Do. [phone] to undergo ESWL as outpatient. Avoid Aspirin and NSAIDS. Return to the ED if your pain worsens. Take meds as prescribed. Finish the antibiotics you already have for another 7 days

## 2019-02-25 PROBLEM — Z00.00 ENCOUNTER FOR PREVENTIVE HEALTH EXAMINATION: Status: ACTIVE | Noted: 2019-02-25

## 2019-02-25 PROBLEM — I10 ESSENTIAL (PRIMARY) HYPERTENSION: Chronic | Status: ACTIVE | Noted: 2019-02-21

## 2019-02-27 DIAGNOSIS — N13.6 PYONEPHROSIS: ICD-10-CM

## 2019-02-27 DIAGNOSIS — Z28.21 IMMUNIZATION NOT CARRIED OUT BECAUSE OF PATIENT REFUSAL: ICD-10-CM

## 2019-02-27 DIAGNOSIS — N18.3 CHRONIC KIDNEY DISEASE, STAGE 3 (MODERATE): ICD-10-CM

## 2019-02-27 DIAGNOSIS — I12.9 HYPERTENSIVE CHRONIC KIDNEY DISEASE WITH STAGE 1 THROUGH STAGE 4 CHRONIC KIDNEY DISEASE, OR UNSPECIFIED CHRONIC KIDNEY DISEASE: ICD-10-CM

## 2019-02-27 DIAGNOSIS — N17.9 ACUTE KIDNEY FAILURE, UNSPECIFIED: ICD-10-CM

## 2019-02-27 LAB
CULTURE RESULTS: SIGNIFICANT CHANGE UP
SPECIMEN SOURCE: SIGNIFICANT CHANGE UP

## 2019-03-01 ENCOUNTER — APPOINTMENT (OUTPATIENT)
Dept: UROLOGY | Facility: CLINIC | Age: 79
End: 2019-03-01
Payer: MEDICARE

## 2019-03-01 VITALS
DIASTOLIC BLOOD PRESSURE: 70 MMHG | SYSTOLIC BLOOD PRESSURE: 124 MMHG | BODY MASS INDEX: 22.66 KG/M2 | HEIGHT: 61 IN | WEIGHT: 120 LBS | HEART RATE: 92 BPM

## 2019-03-01 DIAGNOSIS — Z87.440 PERSONAL HISTORY OF URINARY (TRACT) INFECTIONS: ICD-10-CM

## 2019-03-01 DIAGNOSIS — Z78.9 OTHER SPECIFIED HEALTH STATUS: ICD-10-CM

## 2019-03-01 PROCEDURE — 99205 OFFICE O/P NEW HI 60 MIN: CPT

## 2019-03-01 NOTE — HISTORY OF PRESENT ILLNESS
[Urinary Frequency] : urinary frequency [Nocturia] : nocturia [None] : None [Right Flank] : right flank [FreeTextEntry1] : 78-year-old female here with her  as initial consultation for symptoms of flank pain radiating to right lower quadrant since last Wednesday. No reported episodes of fever. Patient presented also to the emergency room at [facility] for which she was admitted for one to 2 days- CT scan reported right ureteral calculus. She was initially seen by Dr. Ernandez  however on followup visit he did not participate in her insurance program.  Patient reports being discharged once feeling better and urine culture negative.\par Today she reports episodic pain in the right lower back.\par CT scan reviewed on PACS= approximate 10 mm calculus in the right ureter L2-3  {1100 HU  }. [Urinary Incontinence] : no urinary incontinence [Urinary Urgency] : no urinary urgency [Dysuria] : no dysuria [Hematuria - Gross] : no gross hematuria [Fatigue] : no fatigue [Anorexia] : no anorexia

## 2019-03-01 NOTE — LETTER BODY
[Dear  ___] : Dear  [unfilled], [Consult Letter:] : I had the pleasure of evaluating your patient, [unfilled]. [( Thank you for referring [unfilled] for consultation for _____ )] : Thank you for referring [unfilled] for consultation for [unfilled] [FreeTextEntry1] : This is a summary report for consultation at the request for Kerry JON March 1, 2019\par \par Impression: Right ureteral calculus with secondary renal colic. This was discovered on CT scanning during ER evaluation and admission approximately one week ago. Presently patient is asymptomatic, feeling slightly better with the use of tylenol. She denies any episodes of fever or constitutional symptoms at this time.\par \par Recommendation: Urgent right ESWL as stable for surgery at [facility].\par Informed consent obtained.

## 2019-03-01 NOTE — ASSESSMENT
[FreeTextEntry1] : #1 right ureteral calculus, proximal, large\par #2. Right renal colic\par \par Plan\par KUB\par Urgent right ESWL/South/ambulatory/general anesthesia--patient agrees\par DC Flomax\par Tylenol for pain\par informed consent obtained. Risks and benefits discussed including but not limited to infection, bleeding, sepsis, steinstrasse with ureteroscopy and stents, antonia-nephric hematoma, post op retention,unforeseen complications such as MI, CVA, DVT, PE, alternatives , post op course, risks, non-vis of stone, expectations, post op course.\par Patient understands increased risk for emergent cystoscopy with insertion of right ureteral stent if renal colic becomes severe, infection develops, hematuria develops .

## 2019-03-03 ENCOUNTER — FORM ENCOUNTER (OUTPATIENT)
Age: 79
End: 2019-03-03

## 2019-03-04 ENCOUNTER — OUTPATIENT (OUTPATIENT)
Dept: OUTPATIENT SERVICES | Facility: HOSPITAL | Age: 79
LOS: 1 days | Discharge: HOME | End: 2019-03-04

## 2019-03-04 DIAGNOSIS — N20.1 CALCULUS OF URETER: ICD-10-CM

## 2019-03-04 DIAGNOSIS — Z98.890 OTHER SPECIFIED POSTPROCEDURAL STATES: Chronic | ICD-10-CM

## 2019-03-05 ENCOUNTER — FORM ENCOUNTER (OUTPATIENT)
Age: 79
End: 2019-03-05

## 2019-03-06 ENCOUNTER — APPOINTMENT (OUTPATIENT)
Dept: UROLOGY | Facility: HOSPITAL | Age: 79
End: 2019-03-06
Payer: MEDICARE

## 2019-03-06 ENCOUNTER — OUTPATIENT (OUTPATIENT)
Dept: OUTPATIENT SERVICES | Facility: HOSPITAL | Age: 79
LOS: 1 days | Discharge: HOME | End: 2019-03-06

## 2019-03-06 VITALS — HEART RATE: 71 BPM | DIASTOLIC BLOOD PRESSURE: 79 MMHG | SYSTOLIC BLOOD PRESSURE: 132 MMHG | RESPIRATION RATE: 16 BRPM

## 2019-03-06 VITALS
OXYGEN SATURATION: 100 % | HEIGHT: 61 IN | TEMPERATURE: 98 F | WEIGHT: 119.93 LBS | DIASTOLIC BLOOD PRESSURE: 71 MMHG | HEART RATE: 100 BPM | SYSTOLIC BLOOD PRESSURE: 146 MMHG | RESPIRATION RATE: 20 BRPM

## 2019-03-06 DIAGNOSIS — Z98.890 OTHER SPECIFIED POSTPROCEDURAL STATES: Chronic | ICD-10-CM

## 2019-03-06 PROCEDURE — 50590 FRAGMENTING OF KIDNEY STONE: CPT | Mod: RT

## 2019-03-06 RX ORDER — CEFUROXIME AXETIL 250 MG
1 TABLET ORAL
Qty: 6 | Refills: 0
Start: 2019-03-06 | End: 2019-03-08

## 2019-03-06 RX ORDER — KETOROLAC TROMETHAMINE 30 MG/ML
30 SYRINGE (ML) INJECTION ONCE
Qty: 0 | Refills: 0 | Status: DISCONTINUED | OUTPATIENT
Start: 2019-03-06 | End: 2019-03-06

## 2019-03-06 RX ORDER — HYDROMORPHONE HYDROCHLORIDE 2 MG/ML
0.5 INJECTION INTRAMUSCULAR; INTRAVENOUS; SUBCUTANEOUS ONCE
Qty: 0 | Refills: 0 | Status: DISCONTINUED | OUTPATIENT
Start: 2019-03-06 | End: 2019-03-06

## 2019-03-06 RX ORDER — OXYCODONE AND ACETAMINOPHEN 5; 325 MG/1; MG/1
1 TABLET ORAL ONCE
Qty: 0 | Refills: 0 | Status: DISCONTINUED | OUTPATIENT
Start: 2019-03-06 | End: 2019-03-06

## 2019-03-06 RX ORDER — SODIUM CHLORIDE 9 MG/ML
1000 INJECTION, SOLUTION INTRAVENOUS
Qty: 0 | Refills: 0 | Status: DISCONTINUED | OUTPATIENT
Start: 2019-03-06 | End: 2019-03-06

## 2019-03-06 RX ORDER — ONDANSETRON 8 MG/1
4 TABLET, FILM COATED ORAL ONCE
Qty: 0 | Refills: 0 | Status: DISCONTINUED | OUTPATIENT
Start: 2019-03-06 | End: 2019-03-06

## 2019-03-06 RX ADMIN — Medication 30 MILLIGRAM(S): at 13:02

## 2019-03-06 RX ADMIN — SODIUM CHLORIDE 100 MILLILITER(S): 9 INJECTION, SOLUTION INTRAVENOUS at 12:56

## 2019-03-06 NOTE — ASU DISCHARGE PLAN (ADULT/PEDIATRIC). - SPECIAL INSTRUCTIONS
expect dome blood in urine   expect some pain   strain urine  kub before  appt  ambulate  ABT  tylenol /nsaids for pain

## 2019-03-06 NOTE — BRIEF OPERATIVE NOTE - PROCEDURE
<<-----Click on this checkbox to enter Procedure ESWL for urinary calculus  03/06/2019  right proximal ureter  Active  HUMBERTO

## 2019-03-06 NOTE — PRE-ANESTHESIA EVALUATION ADULT - NSANTHDIETYNSD_GEN_ALL_CORE
Valsartan-Hydrochlorothiazide       Last Written Prescription Date: 12/06/2016  Last Fill Quantity: 90, # refills: 1  Last Office Visit with G, P or [facility] prescribing provider: 04/18/2017       Potassium   Date Value Ref Range Status   04/18/2017 3.6 3.4 - 5.3 mmol/L Final     Creatinine   Date Value Ref Range Status   04/18/2017 1.27 (H) 0.52 - 1.04 mg/dL Final     BP Readings from Last 3 Encounters:   04/18/17 124/60   04/10/17 127/74   03/03/17 130/82         Yes

## 2019-03-11 ENCOUNTER — OTHER (OUTPATIENT)
Age: 79
End: 2019-03-11

## 2019-03-11 ENCOUNTER — APPOINTMENT (OUTPATIENT)
Dept: UROLOGY | Facility: CLINIC | Age: 79
End: 2019-03-11

## 2019-03-12 DIAGNOSIS — N20.1 CALCULUS OF URETER: ICD-10-CM

## 2019-03-13 ENCOUNTER — FORM ENCOUNTER (OUTPATIENT)
Age: 79
End: 2019-03-13

## 2019-03-14 ENCOUNTER — OUTPATIENT (OUTPATIENT)
Dept: OUTPATIENT SERVICES | Facility: HOSPITAL | Age: 79
LOS: 1 days | Discharge: HOME | End: 2019-03-14

## 2019-03-14 DIAGNOSIS — N20.1 CALCULUS OF URETER: ICD-10-CM

## 2019-03-14 DIAGNOSIS — Z98.890 OTHER SPECIFIED POSTPROCEDURAL STATES: Chronic | ICD-10-CM

## 2019-03-22 ENCOUNTER — APPOINTMENT (OUTPATIENT)
Dept: UROLOGY | Facility: CLINIC | Age: 79
End: 2019-03-22
Payer: MEDICARE

## 2019-03-22 VITALS
SYSTOLIC BLOOD PRESSURE: 128 MMHG | WEIGHT: 120 LBS | BODY MASS INDEX: 22.66 KG/M2 | HEART RATE: 77 BPM | HEIGHT: 61 IN | DIASTOLIC BLOOD PRESSURE: 74 MMHG

## 2019-03-22 DIAGNOSIS — N23 UNSPECIFIED RENAL COLIC: ICD-10-CM

## 2019-03-22 DIAGNOSIS — N20.1 CALCULUS OF URETER: ICD-10-CM

## 2019-03-22 PROCEDURE — 99024 POSTOP FOLLOW-UP VISIT: CPT

## 2019-03-26 ENCOUNTER — OUTPATIENT (OUTPATIENT)
Dept: OUTPATIENT SERVICES | Facility: HOSPITAL | Age: 79
LOS: 1 days | Discharge: HOME | End: 2019-03-26

## 2019-03-26 DIAGNOSIS — N20.1 CALCULUS OF URETER: ICD-10-CM

## 2019-03-26 DIAGNOSIS — Z98.890 OTHER SPECIFIED POSTPROCEDURAL STATES: Chronic | ICD-10-CM

## 2019-04-01 LAB — COMPN STONE: NORMAL

## 2020-06-05 NOTE — ASU PREOP CHECKLIST - HAND OFF
yes What Type Of Note Output Would You Prefer (Optional)?: Bullet Format How Did Your Itching Occur?: sudden in onset (over a period of weeks to a few months) How Severe Is Your Itching?: moderate

## 2020-12-21 PROBLEM — Z87.440 HISTORY OF URINARY TRACT INFECTION: Status: RESOLVED | Noted: 2019-03-01 | Resolved: 2020-12-21

## 2022-01-01 NOTE — ED ADULT NURSE NOTE - NS ED NURSE LEVEL OF CONSCIOUSNESS SPEECH
Pt in crib with bili light and goggles in place. Pt mother denies any needs or questions. Speaking Coherently

## 2022-01-13 NOTE — BRIEF OPERATIVE NOTE - PRE-OP
I s/w patient,  He said he didn't call us about anything. I told him most recent conversation & info we had with him was Nov 8, 2021 where I had talked with him about us scrotum & testicle results & what Dr Sigrid Christine had said to tell him. He said yes he remembers that. I told him I'd make a note in his chart that he wasn't needing anything etc. He said ok & t marybeth. <<-----Click on this checkbox to enter Pre-Op Dx

## 2022-07-07 NOTE — ASU PREOP CHECKLIST - DENTURES
Abdomen- soft, nontender, nondistended , no guarding or rigidity , no masses palpable , normal bowel sounds no

## 2022-08-11 NOTE — PATIENT PROFILE ADULT - NSPROSPHOSPCHAPLAINYN_GEN_A_NUR
[General Appearance - Well Developed] : well developed [Normal Appearance] : normal appearance [Well Groomed] : well groomed [General Appearance - Well Nourished] : well nourished [No Deformities] : no deformities [General Appearance - In No Acute Distress] : no acute distress [Heart Rate And Rhythm] : heart rate and rhythm were normal [Heart Sounds] : normal S1 and S2 [Murmurs] : no murmurs present [Respiration, Rhythm And Depth] : normal respiratory rhythm and effort [Exaggerated Use Of Accessory Muscles For Inspiration] : no accessory muscle use [Auscultation Breath Sounds / Voice Sounds] : lungs were clear to auscultation bilaterally [Clean] : clean [Dry] : dry [Well-Healed] : well-healed [Abdomen Soft] : soft [Abdomen Tenderness] : non-tender [] : no hepato-splenomegaly [Abdomen Mass (___ Cm)] : no abdominal mass palpated no

## 2023-10-19 NOTE — H&P ADULT - NSHPPOAURINARYCATHETER_GEN_ALL_CORE
Reinitiate low-dose statin.  Continue rest of cardiac medications.  He does not tolerate beta-blockers only on aspirin at this time.   no

## 2023-11-12 NOTE — H&P ADULT - NO PERTINENT FAMILY HISTORY IN FIRST DEGREE RELATIVES OF:
from the pt
32-year-old male presents to ED for medication refill.  Patient explained that he is currently on Adderall 30 mg 3 times a day and he ran out of his medication.  Patient last Adderall prescription was given September 9 and he has not had a refill.  Patient explained that his physician sent his new medication but the pharmacy medication order is yet to be filled.  Patient states that he had not had a dose in the last 2 days.  HEENT: Normal findings, Eyes : PERRLA, EOMI , Nares clear and Throat : WNL  Lungs: Clear B/L with good air entry  CVS: S1-S2 , with no murmurs  Abd : Normal BS, with no tenderness or organomegaly  Ext: Normal findings

## 2023-12-08 ENCOUNTER — INPATIENT (INPATIENT)
Facility: HOSPITAL | Age: 83
LOS: 3 days | Discharge: HOME | DRG: 322 | End: 2023-12-12
Attending: INTERNAL MEDICINE | Admitting: STUDENT IN AN ORGANIZED HEALTH CARE EDUCATION/TRAINING PROGRAM
Payer: MEDICARE

## 2023-12-08 VITALS
OXYGEN SATURATION: 100 % | WEIGHT: 115.08 LBS | DIASTOLIC BLOOD PRESSURE: 72 MMHG | RESPIRATION RATE: 17 BRPM | SYSTOLIC BLOOD PRESSURE: 136 MMHG | HEIGHT: 61 IN | TEMPERATURE: 98 F | HEART RATE: 72 BPM

## 2023-12-08 DIAGNOSIS — I21.4 NON-ST ELEVATION (NSTEMI) MYOCARDIAL INFARCTION: ICD-10-CM

## 2023-12-08 DIAGNOSIS — Z98.890 OTHER SPECIFIED POSTPROCEDURAL STATES: Chronic | ICD-10-CM

## 2023-12-08 LAB
ALBUMIN SERPL ELPH-MCNC: 4.5 G/DL — SIGNIFICANT CHANGE UP (ref 3.5–5.2)
ALBUMIN SERPL ELPH-MCNC: 4.5 G/DL — SIGNIFICANT CHANGE UP (ref 3.5–5.2)
ALBUMIN SERPL ELPH-MCNC: 4.8 G/DL — SIGNIFICANT CHANGE UP (ref 3.5–5.2)
ALBUMIN SERPL ELPH-MCNC: 4.8 G/DL — SIGNIFICANT CHANGE UP (ref 3.5–5.2)
ALP SERPL-CCNC: 89 U/L — SIGNIFICANT CHANGE UP (ref 30–115)
ALP SERPL-CCNC: 89 U/L — SIGNIFICANT CHANGE UP (ref 30–115)
ALP SERPL-CCNC: 92 U/L — SIGNIFICANT CHANGE UP (ref 30–115)
ALP SERPL-CCNC: 92 U/L — SIGNIFICANT CHANGE UP (ref 30–115)
ALT FLD-CCNC: 26 U/L — SIGNIFICANT CHANGE UP (ref 0–41)
ALT FLD-CCNC: 26 U/L — SIGNIFICANT CHANGE UP (ref 0–41)
ALT FLD-CCNC: 34 U/L — SIGNIFICANT CHANGE UP (ref 0–41)
ALT FLD-CCNC: 34 U/L — SIGNIFICANT CHANGE UP (ref 0–41)
ANION GAP SERPL CALC-SCNC: 13 MMOL/L — SIGNIFICANT CHANGE UP (ref 7–14)
APTT BLD: 32.1 SEC — SIGNIFICANT CHANGE UP (ref 27–39.2)
APTT BLD: 32.1 SEC — SIGNIFICANT CHANGE UP (ref 27–39.2)
APTT BLD: 35.1 SEC — SIGNIFICANT CHANGE UP (ref 27–39.2)
APTT BLD: 35.1 SEC — SIGNIFICANT CHANGE UP (ref 27–39.2)
AST SERPL-CCNC: 140 U/L — HIGH (ref 0–41)
AST SERPL-CCNC: 140 U/L — HIGH (ref 0–41)
AST SERPL-CCNC: 85 U/L — HIGH (ref 0–41)
AST SERPL-CCNC: 85 U/L — HIGH (ref 0–41)
BASOPHILS # BLD AUTO: 0.02 K/UL — SIGNIFICANT CHANGE UP (ref 0–0.2)
BASOPHILS NFR BLD AUTO: 0.2 % — SIGNIFICANT CHANGE UP (ref 0–1)
BILIRUB SERPL-MCNC: 1.9 MG/DL — HIGH (ref 0.2–1.2)
BILIRUB SERPL-MCNC: 1.9 MG/DL — HIGH (ref 0.2–1.2)
BILIRUB SERPL-MCNC: 2.4 MG/DL — HIGH (ref 0.2–1.2)
BILIRUB SERPL-MCNC: 2.4 MG/DL — HIGH (ref 0.2–1.2)
BUN SERPL-MCNC: 11 MG/DL — SIGNIFICANT CHANGE UP (ref 10–20)
BUN SERPL-MCNC: 11 MG/DL — SIGNIFICANT CHANGE UP (ref 10–20)
BUN SERPL-MCNC: 9 MG/DL — LOW (ref 10–20)
BUN SERPL-MCNC: 9 MG/DL — LOW (ref 10–20)
CALCIUM SERPL-MCNC: 9.6 MG/DL — SIGNIFICANT CHANGE UP (ref 8.4–10.5)
CALCIUM SERPL-MCNC: 9.6 MG/DL — SIGNIFICANT CHANGE UP (ref 8.4–10.5)
CALCIUM SERPL-MCNC: 9.8 MG/DL — SIGNIFICANT CHANGE UP (ref 8.4–10.4)
CALCIUM SERPL-MCNC: 9.8 MG/DL — SIGNIFICANT CHANGE UP (ref 8.4–10.4)
CHLORIDE SERPL-SCNC: 103 MMOL/L — SIGNIFICANT CHANGE UP (ref 98–110)
CO2 SERPL-SCNC: 25 MMOL/L — SIGNIFICANT CHANGE UP (ref 17–32)
CO2 SERPL-SCNC: 25 MMOL/L — SIGNIFICANT CHANGE UP (ref 17–32)
CO2 SERPL-SCNC: 26 MMOL/L — SIGNIFICANT CHANGE UP (ref 17–32)
CO2 SERPL-SCNC: 26 MMOL/L — SIGNIFICANT CHANGE UP (ref 17–32)
CREAT SERPL-MCNC: 0.7 MG/DL — SIGNIFICANT CHANGE UP (ref 0.7–1.5)
EGFR: 86 ML/MIN/1.73M2 — SIGNIFICANT CHANGE UP
EOSINOPHIL # BLD AUTO: 0.03 K/UL — SIGNIFICANT CHANGE UP (ref 0–0.7)
EOSINOPHIL NFR BLD AUTO: 0.3 % — SIGNIFICANT CHANGE UP (ref 0–8)
GLUCOSE SERPL-MCNC: 107 MG/DL — HIGH (ref 70–99)
GLUCOSE SERPL-MCNC: 107 MG/DL — HIGH (ref 70–99)
GLUCOSE SERPL-MCNC: 117 MG/DL — HIGH (ref 70–99)
GLUCOSE SERPL-MCNC: 117 MG/DL — HIGH (ref 70–99)
HCT VFR BLD CALC: 40.3 % — SIGNIFICANT CHANGE UP (ref 37–47)
HCT VFR BLD CALC: 40.3 % — SIGNIFICANT CHANGE UP (ref 37–47)
HCT VFR BLD CALC: 41.8 % — SIGNIFICANT CHANGE UP (ref 37–47)
HCT VFR BLD CALC: 41.8 % — SIGNIFICANT CHANGE UP (ref 37–47)
HGB BLD-MCNC: 13.7 G/DL — SIGNIFICANT CHANGE UP (ref 12–16)
HGB BLD-MCNC: 13.7 G/DL — SIGNIFICANT CHANGE UP (ref 12–16)
HGB BLD-MCNC: 14 G/DL — SIGNIFICANT CHANGE UP (ref 12–16)
HGB BLD-MCNC: 14 G/DL — SIGNIFICANT CHANGE UP (ref 12–16)
IMM GRANULOCYTES NFR BLD AUTO: 0.3 % — SIGNIFICANT CHANGE UP (ref 0.1–0.3)
IMM GRANULOCYTES NFR BLD AUTO: 0.3 % — SIGNIFICANT CHANGE UP (ref 0.1–0.3)
IMM GRANULOCYTES NFR BLD AUTO: 0.4 % — HIGH (ref 0.1–0.3)
IMM GRANULOCYTES NFR BLD AUTO: 0.4 % — HIGH (ref 0.1–0.3)
INR BLD: 0.94 RATIO — SIGNIFICANT CHANGE UP (ref 0.65–1.3)
INR BLD: 0.94 RATIO — SIGNIFICANT CHANGE UP (ref 0.65–1.3)
LIDOCAIN IGE QN: 69 U/L — HIGH (ref 7–60)
LIDOCAIN IGE QN: 69 U/L — HIGH (ref 7–60)
LYMPHOCYTES # BLD AUTO: 1.49 K/UL — SIGNIFICANT CHANGE UP (ref 1.2–3.4)
LYMPHOCYTES # BLD AUTO: 1.49 K/UL — SIGNIFICANT CHANGE UP (ref 1.2–3.4)
LYMPHOCYTES # BLD AUTO: 1.65 K/UL — SIGNIFICANT CHANGE UP (ref 1.2–3.4)
LYMPHOCYTES # BLD AUTO: 1.65 K/UL — SIGNIFICANT CHANGE UP (ref 1.2–3.4)
LYMPHOCYTES # BLD AUTO: 15.3 % — LOW (ref 20.5–51.1)
LYMPHOCYTES # BLD AUTO: 15.3 % — LOW (ref 20.5–51.1)
LYMPHOCYTES # BLD AUTO: 19.1 % — LOW (ref 20.5–51.1)
LYMPHOCYTES # BLD AUTO: 19.1 % — LOW (ref 20.5–51.1)
MAGNESIUM SERPL-MCNC: 2.3 MG/DL — SIGNIFICANT CHANGE UP (ref 1.8–2.4)
MAGNESIUM SERPL-MCNC: 2.3 MG/DL — SIGNIFICANT CHANGE UP (ref 1.8–2.4)
MAGNESIUM SERPL-MCNC: 2.4 MG/DL — SIGNIFICANT CHANGE UP (ref 1.8–2.4)
MAGNESIUM SERPL-MCNC: 2.4 MG/DL — SIGNIFICANT CHANGE UP (ref 1.8–2.4)
MCHC RBC-ENTMCNC: 27.4 PG — SIGNIFICANT CHANGE UP (ref 27–31)
MCHC RBC-ENTMCNC: 27.4 PG — SIGNIFICANT CHANGE UP (ref 27–31)
MCHC RBC-ENTMCNC: 28 PG — SIGNIFICANT CHANGE UP (ref 27–31)
MCHC RBC-ENTMCNC: 28 PG — SIGNIFICANT CHANGE UP (ref 27–31)
MCHC RBC-ENTMCNC: 33.5 G/DL — SIGNIFICANT CHANGE UP (ref 32–37)
MCHC RBC-ENTMCNC: 33.5 G/DL — SIGNIFICANT CHANGE UP (ref 32–37)
MCHC RBC-ENTMCNC: 34 G/DL — SIGNIFICANT CHANGE UP (ref 32–37)
MCHC RBC-ENTMCNC: 34 G/DL — SIGNIFICANT CHANGE UP (ref 32–37)
MCV RBC AUTO: 81.8 FL — SIGNIFICANT CHANGE UP (ref 81–99)
MCV RBC AUTO: 81.8 FL — SIGNIFICANT CHANGE UP (ref 81–99)
MCV RBC AUTO: 82.2 FL — SIGNIFICANT CHANGE UP (ref 81–99)
MCV RBC AUTO: 82.2 FL — SIGNIFICANT CHANGE UP (ref 81–99)
MONOCYTES # BLD AUTO: 0.64 K/UL — HIGH (ref 0.1–0.6)
MONOCYTES # BLD AUTO: 0.64 K/UL — HIGH (ref 0.1–0.6)
MONOCYTES # BLD AUTO: 0.65 K/UL — HIGH (ref 0.1–0.6)
MONOCYTES # BLD AUTO: 0.65 K/UL — HIGH (ref 0.1–0.6)
MONOCYTES NFR BLD AUTO: 6.7 % — SIGNIFICANT CHANGE UP (ref 1.7–9.3)
MONOCYTES NFR BLD AUTO: 6.7 % — SIGNIFICANT CHANGE UP (ref 1.7–9.3)
MONOCYTES NFR BLD AUTO: 7.4 % — SIGNIFICANT CHANGE UP (ref 1.7–9.3)
MONOCYTES NFR BLD AUTO: 7.4 % — SIGNIFICANT CHANGE UP (ref 1.7–9.3)
NEUTROPHILS # BLD AUTO: 6.27 K/UL — SIGNIFICANT CHANGE UP (ref 1.4–6.5)
NEUTROPHILS # BLD AUTO: 6.27 K/UL — SIGNIFICANT CHANGE UP (ref 1.4–6.5)
NEUTROPHILS # BLD AUTO: 7.49 K/UL — HIGH (ref 1.4–6.5)
NEUTROPHILS # BLD AUTO: 7.49 K/UL — HIGH (ref 1.4–6.5)
NEUTROPHILS NFR BLD AUTO: 72.7 % — SIGNIFICANT CHANGE UP (ref 42.2–75.2)
NEUTROPHILS NFR BLD AUTO: 72.7 % — SIGNIFICANT CHANGE UP (ref 42.2–75.2)
NEUTROPHILS NFR BLD AUTO: 77.1 % — HIGH (ref 42.2–75.2)
NEUTROPHILS NFR BLD AUTO: 77.1 % — HIGH (ref 42.2–75.2)
NRBC # BLD: 0 /100 WBCS — SIGNIFICANT CHANGE UP (ref 0–0)
NT-PROBNP SERPL-SCNC: 1315 PG/ML — HIGH (ref 0–300)
NT-PROBNP SERPL-SCNC: 1315 PG/ML — HIGH (ref 0–300)
PLATELET # BLD AUTO: 207 K/UL — SIGNIFICANT CHANGE UP (ref 130–400)
PLATELET # BLD AUTO: 207 K/UL — SIGNIFICANT CHANGE UP (ref 130–400)
PLATELET # BLD AUTO: 208 K/UL — SIGNIFICANT CHANGE UP (ref 130–400)
PLATELET # BLD AUTO: 208 K/UL — SIGNIFICANT CHANGE UP (ref 130–400)
PMV BLD: 12.7 FL — HIGH (ref 7.4–10.4)
PMV BLD: 12.7 FL — HIGH (ref 7.4–10.4)
PMV BLD: 12.9 FL — HIGH (ref 7.4–10.4)
PMV BLD: 12.9 FL — HIGH (ref 7.4–10.4)
POTASSIUM SERPL-MCNC: 3.3 MMOL/L — LOW (ref 3.5–5)
POTASSIUM SERPL-MCNC: 3.3 MMOL/L — LOW (ref 3.5–5)
POTASSIUM SERPL-MCNC: 3.5 MMOL/L — SIGNIFICANT CHANGE UP (ref 3.5–5)
POTASSIUM SERPL-MCNC: 3.5 MMOL/L — SIGNIFICANT CHANGE UP (ref 3.5–5)
POTASSIUM SERPL-SCNC: 3.3 MMOL/L — LOW (ref 3.5–5)
POTASSIUM SERPL-SCNC: 3.3 MMOL/L — LOW (ref 3.5–5)
POTASSIUM SERPL-SCNC: 3.5 MMOL/L — SIGNIFICANT CHANGE UP (ref 3.5–5)
POTASSIUM SERPL-SCNC: 3.5 MMOL/L — SIGNIFICANT CHANGE UP (ref 3.5–5)
PROT SERPL-MCNC: 7.2 G/DL — SIGNIFICANT CHANGE UP (ref 6–8)
PROT SERPL-MCNC: 7.2 G/DL — SIGNIFICANT CHANGE UP (ref 6–8)
PROT SERPL-MCNC: 7.3 G/DL — SIGNIFICANT CHANGE UP (ref 6–8)
PROT SERPL-MCNC: 7.3 G/DL — SIGNIFICANT CHANGE UP (ref 6–8)
PROTHROM AB SERPL-ACNC: 10.7 SEC — SIGNIFICANT CHANGE UP (ref 9.95–12.87)
PROTHROM AB SERPL-ACNC: 10.7 SEC — SIGNIFICANT CHANGE UP (ref 9.95–12.87)
RBC # BLD: 4.9 M/UL — SIGNIFICANT CHANGE UP (ref 4.2–5.4)
RBC # BLD: 4.9 M/UL — SIGNIFICANT CHANGE UP (ref 4.2–5.4)
RBC # BLD: 5.11 M/UL — SIGNIFICANT CHANGE UP (ref 4.2–5.4)
RBC # BLD: 5.11 M/UL — SIGNIFICANT CHANGE UP (ref 4.2–5.4)
RBC # FLD: 13.2 % — SIGNIFICANT CHANGE UP (ref 11.5–14.5)
RBC # FLD: 13.2 % — SIGNIFICANT CHANGE UP (ref 11.5–14.5)
RBC # FLD: 13.3 % — SIGNIFICANT CHANGE UP (ref 11.5–14.5)
RBC # FLD: 13.3 % — SIGNIFICANT CHANGE UP (ref 11.5–14.5)
SODIUM SERPL-SCNC: 141 MMOL/L — SIGNIFICANT CHANGE UP (ref 135–146)
SODIUM SERPL-SCNC: 141 MMOL/L — SIGNIFICANT CHANGE UP (ref 135–146)
SODIUM SERPL-SCNC: 142 MMOL/L — SIGNIFICANT CHANGE UP (ref 135–146)
SODIUM SERPL-SCNC: 142 MMOL/L — SIGNIFICANT CHANGE UP (ref 135–146)
TROPONIN T SERPL-MCNC: 0.82 NG/ML — CRITICAL HIGH
TROPONIN T SERPL-MCNC: 0.82 NG/ML — CRITICAL HIGH
TROPONIN T SERPL-MCNC: 1.22 NG/ML — CRITICAL HIGH
TROPONIN T SERPL-MCNC: 1.22 NG/ML — CRITICAL HIGH
TROPONIN T SERPL-MCNC: 2.41 NG/ML — CRITICAL HIGH
TROPONIN T SERPL-MCNC: 2.41 NG/ML — CRITICAL HIGH
WBC # BLD: 8.64 K/UL — SIGNIFICANT CHANGE UP (ref 4.8–10.8)
WBC # BLD: 8.64 K/UL — SIGNIFICANT CHANGE UP (ref 4.8–10.8)
WBC # BLD: 9.72 K/UL — SIGNIFICANT CHANGE UP (ref 4.8–10.8)
WBC # BLD: 9.72 K/UL — SIGNIFICANT CHANGE UP (ref 4.8–10.8)
WBC # FLD AUTO: 8.64 K/UL — SIGNIFICANT CHANGE UP (ref 4.8–10.8)
WBC # FLD AUTO: 8.64 K/UL — SIGNIFICANT CHANGE UP (ref 4.8–10.8)
WBC # FLD AUTO: 9.72 K/UL — SIGNIFICANT CHANGE UP (ref 4.8–10.8)
WBC # FLD AUTO: 9.72 K/UL — SIGNIFICANT CHANGE UP (ref 4.8–10.8)

## 2023-12-08 PROCEDURE — 85730 THROMBOPLASTIN TIME PARTIAL: CPT

## 2023-12-08 PROCEDURE — 71046 X-RAY EXAM CHEST 2 VIEWS: CPT | Mod: 26

## 2023-12-08 PROCEDURE — 85025 COMPLETE CBC W/AUTO DIFF WBC: CPT

## 2023-12-08 PROCEDURE — 80053 COMPREHEN METABOLIC PANEL: CPT

## 2023-12-08 PROCEDURE — 83880 ASSAY OF NATRIURETIC PEPTIDE: CPT

## 2023-12-08 PROCEDURE — 84484 ASSAY OF TROPONIN QUANT: CPT

## 2023-12-08 PROCEDURE — 84100 ASSAY OF PHOSPHORUS: CPT

## 2023-12-08 PROCEDURE — 36415 COLL VENOUS BLD VENIPUNCTURE: CPT

## 2023-12-08 PROCEDURE — C1887: CPT

## 2023-12-08 PROCEDURE — 93010 ELECTROCARDIOGRAM REPORT: CPT | Mod: 76

## 2023-12-08 PROCEDURE — 83036 HEMOGLOBIN GLYCOSYLATED A1C: CPT

## 2023-12-08 PROCEDURE — C1769: CPT

## 2023-12-08 PROCEDURE — 80061 LIPID PANEL: CPT

## 2023-12-08 PROCEDURE — 92978 ENDOLUMINL IVUS OCT C 1ST: CPT | Mod: RC

## 2023-12-08 PROCEDURE — 94760 N-INVAS EAR/PLS OXIMETRY 1: CPT

## 2023-12-08 PROCEDURE — 93306 TTE W/DOPPLER COMPLETE: CPT

## 2023-12-08 PROCEDURE — 99291 CRITICAL CARE FIRST HOUR: CPT

## 2023-12-08 PROCEDURE — C1894: CPT

## 2023-12-08 PROCEDURE — C9607: CPT | Mod: RC

## 2023-12-08 PROCEDURE — C1874: CPT

## 2023-12-08 PROCEDURE — 71045 X-RAY EXAM CHEST 1 VIEW: CPT

## 2023-12-08 PROCEDURE — 93458 L HRT ARTERY/VENTRICLE ANGIO: CPT | Mod: 59

## 2023-12-08 PROCEDURE — C1725: CPT

## 2023-12-08 PROCEDURE — C1753: CPT

## 2023-12-08 PROCEDURE — 99285 EMERGENCY DEPT VISIT HI MDM: CPT

## 2023-12-08 PROCEDURE — 84443 ASSAY THYROID STIM HORMONE: CPT

## 2023-12-08 PROCEDURE — 93005 ELECTROCARDIOGRAM TRACING: CPT

## 2023-12-08 PROCEDURE — 85610 PROTHROMBIN TIME: CPT

## 2023-12-08 PROCEDURE — 83735 ASSAY OF MAGNESIUM: CPT

## 2023-12-08 RX ORDER — POTASSIUM CHLORIDE 20 MEQ
20 PACKET (EA) ORAL
Refills: 0 | Status: COMPLETED | OUTPATIENT
Start: 2023-12-08 | End: 2023-12-08

## 2023-12-08 RX ORDER — CLOPIDOGREL BISULFATE 75 MG/1
300 TABLET, FILM COATED ORAL ONCE
Refills: 0 | Status: COMPLETED | OUTPATIENT
Start: 2023-12-08 | End: 2023-12-08

## 2023-12-08 RX ORDER — POTASSIUM CHLORIDE 20 MEQ
40 PACKET (EA) ORAL ONCE
Refills: 0 | Status: DISCONTINUED | OUTPATIENT
Start: 2023-12-08 | End: 2023-12-08

## 2023-12-08 RX ORDER — CHLORHEXIDINE GLUCONATE 213 G/1000ML
1 SOLUTION TOPICAL
Refills: 0 | Status: DISCONTINUED | OUTPATIENT
Start: 2023-12-08 | End: 2023-12-12

## 2023-12-08 RX ORDER — HEPARIN SODIUM 5000 [USP'U]/ML
INJECTION INTRAVENOUS; SUBCUTANEOUS
Qty: 25000 | Refills: 0 | Status: DISCONTINUED | OUTPATIENT
Start: 2023-12-08 | End: 2023-12-11

## 2023-12-08 RX ORDER — CLOPIDOGREL BISULFATE 75 MG/1
75 TABLET, FILM COATED ORAL DAILY
Refills: 0 | Status: DISCONTINUED | OUTPATIENT
Start: 2023-12-09 | End: 2023-12-12

## 2023-12-08 RX ORDER — ASPIRIN/CALCIUM CARB/MAGNESIUM 324 MG
81 TABLET ORAL DAILY
Refills: 0 | Status: DISCONTINUED | OUTPATIENT
Start: 2023-12-09 | End: 2023-12-12

## 2023-12-08 RX ORDER — METOPROLOL TARTRATE 50 MG
12.5 TABLET ORAL EVERY 12 HOURS
Refills: 0 | Status: DISCONTINUED | OUTPATIENT
Start: 2023-12-08 | End: 2023-12-11

## 2023-12-08 RX ORDER — HEPARIN SODIUM 5000 [USP'U]/ML
INJECTION INTRAVENOUS; SUBCUTANEOUS
Qty: 25000 | Refills: 0 | Status: DISCONTINUED | OUTPATIENT
Start: 2023-12-08 | End: 2023-12-08

## 2023-12-08 RX ORDER — ATORVASTATIN CALCIUM 80 MG/1
80 TABLET, FILM COATED ORAL AT BEDTIME
Refills: 0 | Status: DISCONTINUED | OUTPATIENT
Start: 2023-12-08 | End: 2023-12-12

## 2023-12-08 RX ORDER — FAMOTIDINE 10 MG/ML
20 INJECTION INTRAVENOUS DAILY
Refills: 0 | Status: DISCONTINUED | OUTPATIENT
Start: 2023-12-08 | End: 2023-12-12

## 2023-12-08 RX ORDER — ASPIRIN/CALCIUM CARB/MAGNESIUM 324 MG
325 TABLET ORAL ONCE
Refills: 0 | Status: COMPLETED | OUTPATIENT
Start: 2023-12-08 | End: 2023-12-08

## 2023-12-08 RX ORDER — HEPARIN SODIUM 5000 [USP'U]/ML
3200 INJECTION INTRAVENOUS; SUBCUTANEOUS EVERY 6 HOURS
Refills: 0 | Status: DISCONTINUED | OUTPATIENT
Start: 2023-12-08 | End: 2023-12-11

## 2023-12-08 RX ADMIN — CLOPIDOGREL BISULFATE 300 MILLIGRAM(S): 75 TABLET, FILM COATED ORAL at 15:13

## 2023-12-08 RX ADMIN — Medication 20 MILLIEQUIVALENT(S): at 21:23

## 2023-12-08 RX ADMIN — Medication 325 MILLIGRAM(S): at 14:56

## 2023-12-08 RX ADMIN — Medication 20 MILLIEQUIVALENT(S): at 16:37

## 2023-12-08 RX ADMIN — ATORVASTATIN CALCIUM 80 MILLIGRAM(S): 80 TABLET, FILM COATED ORAL at 21:24

## 2023-12-08 RX ADMIN — HEPARIN SODIUM 650 UNIT(S)/HR: 5000 INJECTION INTRAVENOUS; SUBCUTANEOUS at 15:16

## 2023-12-08 RX ADMIN — Medication 12.5 MILLIGRAM(S): at 20:29

## 2023-12-08 RX ADMIN — HEPARIN SODIUM 650 UNIT(S)/HR: 5000 INJECTION INTRAVENOUS; SUBCUTANEOUS at 21:28

## 2023-12-08 NOTE — ED ADULT TRIAGE NOTE - CHIEF COMPLAINT QUOTE
pt with chest pain that started last night, was sent by [facility] for abnormal EKG pt with chest pain that started last night, was sent by [facility] for abnormal EKG

## 2023-12-08 NOTE — H&P ADULT - HISTORY OF PRESENT ILLNESS
84yo woman pmhx HTN presents from home accompanied by her two sons for CP.    Per pt she was in her usual state of health last night, she developed bandlike pressure at the epigastric level. Not associated w/ exertion, non radiating. Pt assumed it was gas and took OTC gas-ex w/ no improvement. Pt attempted to sleep but pain kept her awake through the night, pt endorses that as time passed her extremities felt cool and she had a slight tremmor. In the morning the pain had improved somewhat but she still decided to present to quick care. From quick care she was sent to ER.     Pt denies any SOB, CENTENO, diaphoresis, weakness, fevers, n/v/d, urinary sxs, dizziness. Of note pt lives on a second story condo and climbs 2 flights of stairs a day w/ no chest pain or SOB. Never smoker, no DM, active; parents lived to 82 years of age, unknown causes of death. Pt has no hx of heart dz in any first degree relatives.     ED   /72 HR 72 RR 17 100% T 97.9F  K 3.3 T Bili 1.9 AST 85 T 0.82  EKG     Pt evaluated by cardio, loaded asa/plavix, hep gtt started, K repleted; will be admitted to CCU for close monitoring and r/o ACS     82yo woman pmhx HTN presents from home accompanied by her two sons for CP.    Per pt she was in her usual state of health last night, she developed bandlike pressure at the epigastric level. Not associated w/ exertion, non radiating. Pt assumed it was gas and took OTC gas-ex w/ no improvement. Pt attempted to sleep but pain kept her awake through the night, pt endorses that as time passed her extremities felt cool and she had a slight tremmor. In the morning the pain had improved somewhat but she still decided to present to quick care. From quick care she was sent to ER.     Pt denies any SOB, CENTENO, diaphoresis, weakness, fevers, n/v/d, urinary sxs, dizziness. Of note pt lives on a second story condo and climbs 2 flights of stairs a day w/ no chest pain or SOB. Never smoker, no DM, active; parents lived to 82 years of age, unknown causes of death. Pt has no hx of heart dz in any first degree relatives.     ED   /72 HR 72 RR 17 100% T 97.9F  K 3.3 T Bili 1.9 AST 85 T 0.82  EKG     Pt evaluated by cardio, loaded asa/plavix, hep gtt started, K repleted; will be admitted to CCU for close monitoring and r/o ACS

## 2023-12-08 NOTE — ED PROVIDER NOTE - PROGRESS NOTE DETAILS
MM: Cardiology says cancelled code STEMi, will take to CCU for NSTEMI, rec heparin drip no bolus and plavix 300. MM: Patient now with worsening chest pain, aspirin ordered, repeat EKG ordered, concerning for STEMI, code STEMI called, cardio at bedside.

## 2023-12-08 NOTE — ED PROVIDER NOTE - ATTENDING APP SHARED VISIT CONTRIBUTION OF CARE
84 yo f hx htn  pt presents for eval of cp. pt states last night, she had b/l lower rib pain when she went to sleep. pt took nsaids w/o improvement.  pain persisted prompting eval.  no exertional sx. no f/c/. no ap. n/v/d/lightheaded/syncope.      vss  gen- NAD, aaox3  card-rrr  lungs-ctab, no wheezing or rhonchi  abd-sntnd, no guarding or rebound  neuro- full str/sensation, cn ii-xii grossly intact, normal coordination   LE- no calf pain/swelling/tenderness b/l 82 yo f hx htn  pt presents for eval of cp. pt states last night, she had b/l lower rib pain when she went to sleep. pt took nsaids w/o improvement.  pain persisted prompting eval.  no exertional sx. no f/c/. no ap. n/v/d/lightheaded/syncope.      vss  gen- NAD, aaox3  card-rrr  lungs-ctab, no wheezing or rhonchi  abd-sntnd, no guarding or rebound  neuro- full str/sensation, cn ii-xii grossly intact, normal coordination   LE- no calf pain/swelling/tenderness b/l

## 2023-12-08 NOTE — H&P ADULT - ATTENDING COMMENTS
Patient seen and examined at bedside on 12/8  NSTEMI  currently stable, chest pain  resolved  active patient at baseline  will treat guideline ACS (DAPT, statin , BB)  ccu monitoring  TTE  will need C early next week

## 2023-12-08 NOTE — ED ADULT NURSE NOTE - CHIEF COMPLAINT QUOTE
pt with chest pain that started last night, was sent by [facility] for abnormal EKG pt with chest pain that started last night, was sent by [facility] for abnormal EKG

## 2023-12-08 NOTE — ED PROVIDER NOTE - OBJECTIVE STATEMENT
83y F pmhx HTN, hasn't seen cardiologist in many years most recent stress test many years ago presenting for evaluation of chest pain started last night 10/10, she took motrin and went to sleep. Woke up this morning and chest pain persisted, so presents to ED for evaluation. Patient's pain not worse with exertion or relieved with rest.

## 2023-12-08 NOTE — PATIENT PROFILE ADULT - FALL HARM RISK - UNIVERSAL INTERVENTIONS
Bed in lowest position, wheels locked, appropriate side rails in place/Call bell, personal items and telephone in reach/Instruct patient to call for assistance before getting out of bed or chair/Non-slip footwear when patient is out of bed/[place] to call system/Physically safe environment - no spills, clutter or unnecessary equipment/Purposeful Proactive Rounding/Room/bathroom lighting operational, light cord in reach Bed in lowest position, wheels locked, appropriate side rails in place/Call bell, personal items and telephone in reach/Instruct patient to call for assistance before getting out of bed or chair/Non-slip footwear when patient is out of bed/[place] to call system/Physically safe environment - no spills, clutter or unnecessary equipment/Purposeful Proactive Rounding/Room/bathroom lighting operational, light cord in reach

## 2023-12-08 NOTE — ED ADULT NURSE NOTE - NSFALLUNIVINTERV_ED_ALL_ED
Bed/Stretcher in lowest position, wheels locked, appropriate side rails in place/Call bell, personal items and telephone in reach/Instruct patient to call for assistance before getting out of bed/chair/stretcher/Non-slip footwear applied when patient is off stretcher/[place] to call system/Physically safe environment - no spills, clutter or unnecessary equipment/Purposeful proactive rounding/Room/bathroom lighting operational, light cord in reach Bed/Stretcher in lowest position, wheels locked, appropriate side rails in place/Call bell, personal items and telephone in reach/Instruct patient to call for assistance before getting out of bed/chair/stretcher/Non-slip footwear applied when patient is off stretcher/[place] to call system/Physically safe environment - no spills, clutter or unnecessary equipment/Purposeful proactive rounding/Room/bathroom lighting operational, light cord in reach

## 2023-12-08 NOTE — H&P ADULT - NSHPREVIEWOFSYSTEMS_GEN_ALL_CORE
General:	denies night sweats, wt changes, cold/heat intolerance    Skin: denies rashes, pruritis, nail/hair changes  	  Ophthalmologic: denies vision changes, denies eye discharge or irritation  	  ENMT: denies nasal discharge, hearing changes, mouth sores, difficulty swallowing    Respiratory and Thorax: endorses 1 episode of "productive phlegm" but denies cough, denies difficulty breathing, denies shortness of breath  	  Cardiovascular: denies current CP, denies CENTENO, denies palpitation    Gastrointestinal: denies n/v/d    Genitourinary: denies urgency, burning, nocturia    Musculoskeletal: denies muscle/joint pain    Neurological: denies dizziness, syncope, HA    Psychiatric: denies si/hi and hallucinations    Hematology/Lymphatics: denies easy bleeding/bruising    Endocrine: denies wt changes, hair/nail changes, denies cold/heat sensitivity

## 2023-12-08 NOTE — ED ADULT NURSE NOTE - OBJECTIVE STATEMENT
Patient AOx3, reports chest pain starting yesterday seen at urgent care  and sent in for "abnormal chest pain. denes SOB, N/V/D

## 2023-12-08 NOTE — ED PROVIDER NOTE - NS ED ATTENDING STATEMENT MOD
This was a shared visit with the DOMINIK. I reviewed and verified the documentation and independently performed the documented:
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
## 2023-12-08 NOTE — H&P ADULT - NSHPPHYSICALEXAM_GEN_ALL_CORE
CONSTITUTIONAL: NAD, appropriately anxious but comfortable, thin, appears younger than stated age    EYES: PERRLA and symmetric, EOMI, No conjunctival or scleral injection, non-icteric    ENMT: Oral mucosa with moist membranes. No external nasal lesions; normal dentition; no gross hearing impairment noted.    NECK: Supple, symmetric and without tracheal deviation; thyroid gland not enlarged and without palpable masses    RESPIRATORY: No respiratory distress, no use of accessory muscles; CTA b/l, no wheezes, rales or rhonchi, no dullness or hyperresonance to percussion, no tactile fremitus, no subcutaneous emphysema    CARDIOVASCULAR: RRRR, +S1S2, no murmur appreciated, no rubs, no gallops; no JVD; no peripheral edema    Vascular: no carotid bruits; radial pulse palpable, posterior tibialis pulse palpable    GASTROINTESTINAL: Soft, non tender, non distended, no rebound, no guarding; No palpable masses; no hepatosplenomegaly; no hernia palpated;    MUSCULOSKELETAL: no significant limitation on ROM, moves all extremities in plane of bed    SKIN: No rashes or ulcers noted; no subcutaneous nodules or induration palpable    NEUROLOGIC: moves all extremities against resistance, no droop    PSYCHIATRIC: Appropriate insight/judgment; A+O x 3, mood and affect appropriate, recent/remote memory intact

## 2023-12-08 NOTE — H&P ADULT - ASSESSMENT
EKG -     IMPRESSION:    - NSTEMI  - HTN    PLAN:    CNS  - avoid oversedation  - considering age would encourage good sleep/wake cycle; frequent reorientation    HEENT  - oral care    PULM  - HOB 45 degrees  - on room air  - f/u CXR    CARDIOVASCULAR  - lipid panel  - statin  - trend CE  - serial EKG  - TTE w/ con    GI  - tolerating feeds, having BMs  - f/u CMP  - hepatic panel  - RUQ US    RENAL  - replete 'lytes K>4 Mg>2.1    ID  - observe off abx    HEME   - trend h/h while on AC/AP    ENDO  - a1c  - tsh    MSK  - pt/ot    DISPO: CCU EKG -     IMPRESSION:    - NSTEMI  - HTN    PLAN:    CNS  - avoid oversedation  - considering age would encourage good sleep/wake cycle; frequent reorientation    HEENT  - oral care    PULM  - HOB 45 degrees  - on room air  - f/u CXR    CARDIOVASCULAR  - lipid panel  - statin  - hold amlodipine for now  - trend CE  - serial EKG  - TTE w/ con    GI  - tolerating feeds, having BMs  - f/u CMP  - hepatic panel  - RUQ US    RENAL  - replete 'lytes K>4 Mg>2.1    ID  - observe off abx    HEME   - trend h/h while on AC/AP    ENDO  - a1c  - tsh    MSK  - pt/ot    DISPO: CCU

## 2023-12-08 NOTE — H&P ADULT - NSHPLABSRESULTS_GEN_ALL_CORE
MEDICATIONS:  STANDING MEDICATIONS  heparin  Infusion.  Unit(s)/Hr IV Continuous <Continuous>  potassium chloride    Tablet ER 20 milliEquivalent(s) Oral every 2 hours    PRN MEDICATIONS  heparin   Injectable 3200 Unit(s) IV Push every 6 hours PRN      LABS:                        14.0   9.72  )-----------( 207      ( 08 Dec 2023 13:00 )             41.8     12-08    141  |  103  |  11  ----------------------------<  107<H>  3.3<L>   |  25  |  0.7    Ca    9.6      08 Dec 2023 13:00  Mg     2.4     12-08    TPro  7.2  /  Alb  4.5  /  TBili  1.9<H>  /  DBili  x   /  AST  85<H>  /  ALT  26  /  AlkPhos  92  12-08      Urinalysis Basic - ( 08 Dec 2023 13:00 )    Color: x / Appearance: x / SG: x / pH: x  Gluc: 107 mg/dL / Ketone: x  / Bili: x / Urobili: x   Blood: x / Protein: x / Nitrite: x   Leuk Esterase: x / RBC: x / WBC x   Sq Epi: x / Non Sq Epi: x / Bacteria: x        Troponin T, Serum: 0.82 ng/mL *HH* (12-08-23 @ 13:00)      CARDIAC MARKERS ( 08 Dec 2023 13:00 )  x     / 0.82 ng/mL / x     / x     / x          VITALS:   T(F): 97.9  HR: 72  BP: 136/72  RR: 17  SpO2: 100%

## 2023-12-08 NOTE — ED PROVIDER NOTE - PHYSICAL EXAMINATION
Vital Signs: I have reviewed the initial vital signs.  Constitutional: well-nourished, appears stated age, no acute distress.  HEENT: Airway patent, moist MM, EOMI,   CV: regular rate, regular rhythm, well-perfused extremities,   Lungs: Clear to ascultation bilaterally, no increased work of breathing.  ABD: Non-tender, Non-distended,   INTEG: Skin warm, dry, no rash.  NEURO: A&Ox3, moving all extremities, normal speech

## 2023-12-08 NOTE — ED PROVIDER NOTE - CLINICAL SUMMARY MEDICAL DECISION MAKING FREE TEXT BOX
ed w/u significant for elevated trop. pt w/ recurrence of CP in ed, rpt ekg c/f ST inferior, STEMI called  pt evaluated by cards and code cancelled  cards recs ccu, medical management started

## 2023-12-09 LAB
A1C WITH ESTIMATED AVERAGE GLUCOSE RESULT: 5.5 % — SIGNIFICANT CHANGE UP (ref 4–5.6)
A1C WITH ESTIMATED AVERAGE GLUCOSE RESULT: 5.5 % — SIGNIFICANT CHANGE UP (ref 4–5.6)
ALBUMIN SERPL ELPH-MCNC: 4 G/DL — SIGNIFICANT CHANGE UP (ref 3.5–5.2)
ALBUMIN SERPL ELPH-MCNC: 4 G/DL — SIGNIFICANT CHANGE UP (ref 3.5–5.2)
ALP SERPL-CCNC: 79 U/L — SIGNIFICANT CHANGE UP (ref 30–115)
ALP SERPL-CCNC: 79 U/L — SIGNIFICANT CHANGE UP (ref 30–115)
ALT FLD-CCNC: 32 U/L — SIGNIFICANT CHANGE UP (ref 0–41)
ALT FLD-CCNC: 32 U/L — SIGNIFICANT CHANGE UP (ref 0–41)
ANION GAP SERPL CALC-SCNC: 14 MMOL/L — SIGNIFICANT CHANGE UP (ref 7–14)
ANION GAP SERPL CALC-SCNC: 14 MMOL/L — SIGNIFICANT CHANGE UP (ref 7–14)
APTT BLD: 39.8 SEC — HIGH (ref 27–39.2)
APTT BLD: 39.8 SEC — HIGH (ref 27–39.2)
APTT BLD: 48.4 SEC — HIGH (ref 27–39.2)
APTT BLD: 48.4 SEC — HIGH (ref 27–39.2)
APTT BLD: 53.1 SEC — HIGH (ref 27–39.2)
APTT BLD: 53.1 SEC — HIGH (ref 27–39.2)
APTT BLD: 57.8 SEC — HIGH (ref 27–39.2)
APTT BLD: 57.8 SEC — HIGH (ref 27–39.2)
AST SERPL-CCNC: 122 U/L — HIGH (ref 0–41)
AST SERPL-CCNC: 122 U/L — HIGH (ref 0–41)
BASOPHILS # BLD AUTO: 0.03 K/UL — SIGNIFICANT CHANGE UP (ref 0–0.2)
BASOPHILS # BLD AUTO: 0.03 K/UL — SIGNIFICANT CHANGE UP (ref 0–0.2)
BASOPHILS NFR BLD AUTO: 0.4 % — SIGNIFICANT CHANGE UP (ref 0–1)
BASOPHILS NFR BLD AUTO: 0.4 % — SIGNIFICANT CHANGE UP (ref 0–1)
BILIRUB SERPL-MCNC: 2.9 MG/DL — HIGH (ref 0.2–1.2)
BILIRUB SERPL-MCNC: 2.9 MG/DL — HIGH (ref 0.2–1.2)
BUN SERPL-MCNC: 9 MG/DL — LOW (ref 10–20)
BUN SERPL-MCNC: 9 MG/DL — LOW (ref 10–20)
CALCIUM SERPL-MCNC: 9.4 MG/DL — SIGNIFICANT CHANGE UP (ref 8.4–10.5)
CALCIUM SERPL-MCNC: 9.4 MG/DL — SIGNIFICANT CHANGE UP (ref 8.4–10.5)
CHLORIDE SERPL-SCNC: 106 MMOL/L — SIGNIFICANT CHANGE UP (ref 98–110)
CHLORIDE SERPL-SCNC: 106 MMOL/L — SIGNIFICANT CHANGE UP (ref 98–110)
CHOLEST SERPL-MCNC: 135 MG/DL — SIGNIFICANT CHANGE UP
CHOLEST SERPL-MCNC: 135 MG/DL — SIGNIFICANT CHANGE UP
CO2 SERPL-SCNC: 22 MMOL/L — SIGNIFICANT CHANGE UP (ref 17–32)
CO2 SERPL-SCNC: 22 MMOL/L — SIGNIFICANT CHANGE UP (ref 17–32)
CREAT SERPL-MCNC: 0.7 MG/DL — SIGNIFICANT CHANGE UP (ref 0.7–1.5)
CREAT SERPL-MCNC: 0.7 MG/DL — SIGNIFICANT CHANGE UP (ref 0.7–1.5)
EGFR: 86 ML/MIN/1.73M2 — SIGNIFICANT CHANGE UP
EGFR: 86 ML/MIN/1.73M2 — SIGNIFICANT CHANGE UP
EOSINOPHIL # BLD AUTO: 0.02 K/UL — SIGNIFICANT CHANGE UP (ref 0–0.7)
EOSINOPHIL # BLD AUTO: 0.02 K/UL — SIGNIFICANT CHANGE UP (ref 0–0.7)
EOSINOPHIL NFR BLD AUTO: 0.3 % — SIGNIFICANT CHANGE UP (ref 0–8)
EOSINOPHIL NFR BLD AUTO: 0.3 % — SIGNIFICANT CHANGE UP (ref 0–8)
ESTIMATED AVERAGE GLUCOSE: 111 MG/DL — SIGNIFICANT CHANGE UP (ref 68–114)
ESTIMATED AVERAGE GLUCOSE: 111 MG/DL — SIGNIFICANT CHANGE UP (ref 68–114)
GLUCOSE SERPL-MCNC: 100 MG/DL — HIGH (ref 70–99)
GLUCOSE SERPL-MCNC: 100 MG/DL — HIGH (ref 70–99)
HCT VFR BLD CALC: 41.7 % — SIGNIFICANT CHANGE UP (ref 37–47)
HCT VFR BLD CALC: 41.7 % — SIGNIFICANT CHANGE UP (ref 37–47)
HDLC SERPL-MCNC: 55 MG/DL — SIGNIFICANT CHANGE UP
HDLC SERPL-MCNC: 55 MG/DL — SIGNIFICANT CHANGE UP
HGB BLD-MCNC: 13.8 G/DL — SIGNIFICANT CHANGE UP (ref 12–16)
HGB BLD-MCNC: 13.8 G/DL — SIGNIFICANT CHANGE UP (ref 12–16)
IMM GRANULOCYTES NFR BLD AUTO: 0.4 % — HIGH (ref 0.1–0.3)
IMM GRANULOCYTES NFR BLD AUTO: 0.4 % — HIGH (ref 0.1–0.3)
INR BLD: 1.01 RATIO — SIGNIFICANT CHANGE UP (ref 0.65–1.3)
INR BLD: 1.01 RATIO — SIGNIFICANT CHANGE UP (ref 0.65–1.3)
LIPID PNL WITH DIRECT LDL SERPL: 63 MG/DL — SIGNIFICANT CHANGE UP
LIPID PNL WITH DIRECT LDL SERPL: 63 MG/DL — SIGNIFICANT CHANGE UP
LYMPHOCYTES # BLD AUTO: 1.49 K/UL — SIGNIFICANT CHANGE UP (ref 1.2–3.4)
LYMPHOCYTES # BLD AUTO: 1.49 K/UL — SIGNIFICANT CHANGE UP (ref 1.2–3.4)
LYMPHOCYTES # BLD AUTO: 20.6 % — SIGNIFICANT CHANGE UP (ref 20.5–51.1)
LYMPHOCYTES # BLD AUTO: 20.6 % — SIGNIFICANT CHANGE UP (ref 20.5–51.1)
MAGNESIUM SERPL-MCNC: 2.3 MG/DL — SIGNIFICANT CHANGE UP (ref 1.8–2.4)
MAGNESIUM SERPL-MCNC: 2.3 MG/DL — SIGNIFICANT CHANGE UP (ref 1.8–2.4)
MCHC RBC-ENTMCNC: 27.5 PG — SIGNIFICANT CHANGE UP (ref 27–31)
MCHC RBC-ENTMCNC: 27.5 PG — SIGNIFICANT CHANGE UP (ref 27–31)
MCHC RBC-ENTMCNC: 33.1 G/DL — SIGNIFICANT CHANGE UP (ref 32–37)
MCHC RBC-ENTMCNC: 33.1 G/DL — SIGNIFICANT CHANGE UP (ref 32–37)
MCV RBC AUTO: 83.1 FL — SIGNIFICANT CHANGE UP (ref 81–99)
MCV RBC AUTO: 83.1 FL — SIGNIFICANT CHANGE UP (ref 81–99)
MONOCYTES # BLD AUTO: 0.54 K/UL — SIGNIFICANT CHANGE UP (ref 0.1–0.6)
MONOCYTES # BLD AUTO: 0.54 K/UL — SIGNIFICANT CHANGE UP (ref 0.1–0.6)
MONOCYTES NFR BLD AUTO: 7.5 % — SIGNIFICANT CHANGE UP (ref 1.7–9.3)
MONOCYTES NFR BLD AUTO: 7.5 % — SIGNIFICANT CHANGE UP (ref 1.7–9.3)
NEUTROPHILS # BLD AUTO: 5.11 K/UL — SIGNIFICANT CHANGE UP (ref 1.4–6.5)
NEUTROPHILS # BLD AUTO: 5.11 K/UL — SIGNIFICANT CHANGE UP (ref 1.4–6.5)
NEUTROPHILS NFR BLD AUTO: 70.8 % — SIGNIFICANT CHANGE UP (ref 42.2–75.2)
NEUTROPHILS NFR BLD AUTO: 70.8 % — SIGNIFICANT CHANGE UP (ref 42.2–75.2)
NON HDL CHOLESTEROL: 80 MG/DL — SIGNIFICANT CHANGE UP
NON HDL CHOLESTEROL: 80 MG/DL — SIGNIFICANT CHANGE UP
NRBC # BLD: 0 /100 WBCS — SIGNIFICANT CHANGE UP (ref 0–0)
NRBC # BLD: 0 /100 WBCS — SIGNIFICANT CHANGE UP (ref 0–0)
PLATELET # BLD AUTO: 193 K/UL — SIGNIFICANT CHANGE UP (ref 130–400)
PLATELET # BLD AUTO: 193 K/UL — SIGNIFICANT CHANGE UP (ref 130–400)
PMV BLD: 12.5 FL — HIGH (ref 7.4–10.4)
PMV BLD: 12.5 FL — HIGH (ref 7.4–10.4)
POTASSIUM SERPL-MCNC: 3.9 MMOL/L — SIGNIFICANT CHANGE UP (ref 3.5–5)
POTASSIUM SERPL-MCNC: 3.9 MMOL/L — SIGNIFICANT CHANGE UP (ref 3.5–5)
POTASSIUM SERPL-SCNC: 3.9 MMOL/L — SIGNIFICANT CHANGE UP (ref 3.5–5)
POTASSIUM SERPL-SCNC: 3.9 MMOL/L — SIGNIFICANT CHANGE UP (ref 3.5–5)
PROT SERPL-MCNC: 6.4 G/DL — SIGNIFICANT CHANGE UP (ref 6–8)
PROT SERPL-MCNC: 6.4 G/DL — SIGNIFICANT CHANGE UP (ref 6–8)
PROTHROM AB SERPL-ACNC: 11.5 SEC — SIGNIFICANT CHANGE UP (ref 9.95–12.87)
PROTHROM AB SERPL-ACNC: 11.5 SEC — SIGNIFICANT CHANGE UP (ref 9.95–12.87)
RBC # BLD: 5.02 M/UL — SIGNIFICANT CHANGE UP (ref 4.2–5.4)
RBC # BLD: 5.02 M/UL — SIGNIFICANT CHANGE UP (ref 4.2–5.4)
RBC # FLD: 13.4 % — SIGNIFICANT CHANGE UP (ref 11.5–14.5)
RBC # FLD: 13.4 % — SIGNIFICANT CHANGE UP (ref 11.5–14.5)
SODIUM SERPL-SCNC: 142 MMOL/L — SIGNIFICANT CHANGE UP (ref 135–146)
SODIUM SERPL-SCNC: 142 MMOL/L — SIGNIFICANT CHANGE UP (ref 135–146)
TRIGL SERPL-MCNC: 86 MG/DL — SIGNIFICANT CHANGE UP
TRIGL SERPL-MCNC: 86 MG/DL — SIGNIFICANT CHANGE UP
TROPONIN T SERPL-MCNC: 2.4 NG/ML — CRITICAL HIGH
TROPONIN T SERPL-MCNC: 2.4 NG/ML — CRITICAL HIGH
TSH SERPL-MCNC: 2.27 UIU/ML — SIGNIFICANT CHANGE UP (ref 0.27–4.2)
TSH SERPL-MCNC: 2.27 UIU/ML — SIGNIFICANT CHANGE UP (ref 0.27–4.2)
WBC # BLD: 7.22 K/UL — SIGNIFICANT CHANGE UP (ref 4.8–10.8)
WBC # BLD: 7.22 K/UL — SIGNIFICANT CHANGE UP (ref 4.8–10.8)
WBC # FLD AUTO: 7.22 K/UL — SIGNIFICANT CHANGE UP (ref 4.8–10.8)
WBC # FLD AUTO: 7.22 K/UL — SIGNIFICANT CHANGE UP (ref 4.8–10.8)

## 2023-12-09 PROCEDURE — 93010 ELECTROCARDIOGRAM REPORT: CPT

## 2023-12-09 PROCEDURE — 99291 CRITICAL CARE FIRST HOUR: CPT

## 2023-12-09 PROCEDURE — 93306 TTE W/DOPPLER COMPLETE: CPT | Mod: 26

## 2023-12-09 RX ORDER — INFLUENZA VIRUS VACCINE 15; 15; 15; 15 UG/.5ML; UG/.5ML; UG/.5ML; UG/.5ML
0.7 SUSPENSION INTRAMUSCULAR ONCE
Refills: 0 | Status: DISCONTINUED | OUTPATIENT
Start: 2023-12-09 | End: 2023-12-12

## 2023-12-09 RX ADMIN — CHLORHEXIDINE GLUCONATE 1 APPLICATION(S): 213 SOLUTION TOPICAL at 06:05

## 2023-12-09 RX ADMIN — HEPARIN SODIUM 850 UNIT(S)/HR: 5000 INJECTION INTRAVENOUS; SUBCUTANEOUS at 21:56

## 2023-12-09 RX ADMIN — HEPARIN SODIUM 850 UNIT(S)/HR: 5000 INJECTION INTRAVENOUS; SUBCUTANEOUS at 19:40

## 2023-12-09 RX ADMIN — Medication 81 MILLIGRAM(S): at 12:10

## 2023-12-09 RX ADMIN — HEPARIN SODIUM 750 UNIT(S)/HR: 5000 INJECTION INTRAVENOUS; SUBCUTANEOUS at 04:54

## 2023-12-09 RX ADMIN — FAMOTIDINE 20 MILLIGRAM(S): 10 INJECTION INTRAVENOUS at 12:10

## 2023-12-09 RX ADMIN — Medication 12.5 MILLIGRAM(S): at 06:04

## 2023-12-09 RX ADMIN — ATORVASTATIN CALCIUM 80 MILLIGRAM(S): 80 TABLET, FILM COATED ORAL at 21:56

## 2023-12-09 RX ADMIN — Medication 12.5 MILLIGRAM(S): at 17:25

## 2023-12-09 RX ADMIN — HEPARIN SODIUM 850 UNIT(S)/HR: 5000 INJECTION INTRAVENOUS; SUBCUTANEOUS at 13:17

## 2023-12-09 RX ADMIN — CLOPIDOGREL BISULFATE 75 MILLIGRAM(S): 75 TABLET, FILM COATED ORAL at 12:10

## 2023-12-09 NOTE — PROGRESS NOTE ADULT - SUBJECTIVE AND OBJECTIVE BOX
Cardiology Attending Note    VIBHA RICHARDS  MRN-[id number]    Date of Admission: 12-08-23    Brief HPI: 84 y/o female PMH HTN presenting with chest pain and found to have NSTEMI.    24 hr events:    artificial  tears Solution 1 Drop(s) Both EYES every 12 hours  aspirin enteric coated 81 milliGRAM(s) Oral daily  atorvastatin 80 milliGRAM(s) Oral at bedtime  chlorhexidine 2% Cloths 1 Application(s) Topical <User Schedule>  clopidogrel Tablet 75 milliGRAM(s) Oral daily  famotidine    Tablet 20 milliGRAM(s) Oral daily  heparin   Injectable 3200 Unit(s) IV Push every 6 hours PRN  heparin  Infusion.  Unit(s)/Hr IV Continuous <Continuous>  metoprolol tartrate 12.5 milliGRAM(s) Oral every 12 hours      PHYSICAL EXAM:  T(C): 36.4 (12-09-23 @ 08:00), Max: 37.1 (12-09-23 @ 04:00)  T(F): 97.5 (12-09-23 @ 08:00), Max: 98.8 (12-09-23 @ 04:00)  HR: 62 (12-09-23 @ 08:00) (55 - 73)  BP: 128/85 (12-09-23 @ 08:00) (107/60 - 137/65)  RR: 19 (12-09-23 @ 08:00) (15 - 20)  SpO2: 95% (12-09-23 @ 08:00) (93% - 100%)  Height (cm): 154.9 (12-08-23 @ 20:00)  Weight (kg): 52.2 (12-08-23 @ 20:00)  BSA (m2): 1.49 (12-08-23 @ 20:00)    12-08-23 @ 07:01  -  12-09-23 @ 07:00  --------------------------------------------------------  IN: 87.5 mL / OUT: 750 mL / NET: -662.5 mL    12-09-23 @ 07:01  -  12-09-23 @ 08:49  --------------------------------------------------------  IN: 65 mL / OUT: 0 mL / NET: 65 mL      I&O's Detail    08 Dec 2023 07:01  -  09 Dec 2023 07:00  --------------------------------------------------------  IN:    Heparin Infusion: 13 mL    Heparin Infusion: 74.5 mL  Total IN: 87.5 mL    OUT:    Voided (mL): 750 mL  Total OUT: 750 mL    Total NET: -662.5 mL      09 Dec 2023 07:01  -  09 Dec 2023 08:49  --------------------------------------------------------  IN:    Heparin Infusion: 15 mL    Oral Fluid: 50 mL  Total IN: 65 mL    OUT:  Total OUT: 0 mL    Total NET: 65 mL          ABP: --  ABP(mean): --  CVP(mm Hg): --  CVP(cm H2O): --  CO: --  CI: --  PA: --  PA(mean): --  PA(direct): --  PCWP: --  LA: --  RA: --  SVR: --  SVRI: --  PVR: --  PVRI: --    General Appearance: no acute distress	  Cardiovascular: regular rate and rhythm S1 S2, No JVD, No murmurs  Respiratory: clear bilaterally  Gastrointestinal:  soft, non-tender  Skin/Integumen: no edema  Neurologic: non focal  Musculoskeletal/ extremities: warm  Vascular: Peripheral pulses palpable 2+ bilaterally    LABS:	 	                        13.8   7.22  )-----------( 193      ( 09 Dec 2023 04:43 )             41.7     12-09    142  |  106  |  9<L>  ----------------------------<  100<H>  3.9   |  22  |  0.7    Ca    9.4      09 Dec 2023 04:43  Mg     2.3     12-09    TPro  6.4  /  Alb  4.0  /  TBili  2.9<H>  /  DBili  x   /  AST  122<H>  /  ALT  32  /  AlkPhos  79  12-09    PT/INR - ( 09 Dec 2023 04:04 )   PT: 11.50 sec;   INR: 1.01 ratio         PTT - ( 09 Dec 2023 04:04 )  PTT:39.8 sec    Troponin T, Serum: 2.40 ng/mL (12-09-23 @ 04:43)  Troponin T, Serum: 2.41 ng/mL (12-08-23 @ 21:27)  Troponin T, Serum: 1.22 ng/mL (12-08-23 @ 15:31)  Troponin T, Serum: 0.82 ng/mL (12-08-23 @ 13:00)          CARDIAC TESTING:  ECG:  	  Telemetry:  Echocardiogram:  Coronary angiogram:  RHC:  Stress test:  cMRI:    OTHER DIAGNOSTIC TESTING:  CXR:  CT:   Cardiology Attending Note    VIBHA RICHARDS  MRN-[id number]    Date of Admission: 12-08-23    Brief HPI: 84 y/o female PMH HTN presenting with chest pain and found to have NSTEMI.    24 hr events:    artificial  tears Solution 1 Drop(s) Both EYES every 12 hours  aspirin enteric coated 81 milliGRAM(s) Oral daily  atorvastatin 80 milliGRAM(s) Oral at bedtime  chlorhexidine 2% Cloths 1 Application(s) Topical <User Schedule>  clopidogrel Tablet 75 milliGRAM(s) Oral daily  famotidine    Tablet 20 milliGRAM(s) Oral daily  heparin   Injectable 3200 Unit(s) IV Push every 6 hours PRN  heparin  Infusion.  Unit(s)/Hr IV Continuous <Continuous>  metoprolol tartrate 12.5 milliGRAM(s) Oral every 12 hours      PHYSICAL EXAM:  T(C): 36.4 (12-09-23 @ 08:00), Max: 37.1 (12-09-23 @ 04:00)  T(F): 97.5 (12-09-23 @ 08:00), Max: 98.8 (12-09-23 @ 04:00)  HR: 62 (12-09-23 @ 08:00) (55 - 73)  BP: 128/85 (12-09-23 @ 08:00) (107/60 - 137/65)  RR: 19 (12-09-23 @ 08:00) (15 - 20)  SpO2: 95% (12-09-23 @ 08:00) (93% - 100%)  Height (cm): 154.9 (12-08-23 @ 20:00)  Weight (kg): 52.2 (12-08-23 @ 20:00)  BSA (m2): 1.49 (12-08-23 @ 20:00)    12-08-23 @ 07:01  -  12-09-23 @ 07:00  --------------------------------------------------------  IN: 87.5 mL / OUT: 750 mL / NET: -662.5 mL    12-09-23 @ 07:01  -  12-09-23 @ 08:49  --------------------------------------------------------  IN: 65 mL / OUT: 0 mL / NET: 65 mL      I&O's Detail    08 Dec 2023 07:01  -  09 Dec 2023 07:00  --------------------------------------------------------  IN:    Heparin Infusion: 13 mL    Heparin Infusion: 74.5 mL  Total IN: 87.5 mL    OUT:    Voided (mL): 750 mL  Total OUT: 750 mL    Total NET: -662.5 mL      09 Dec 2023 07:01  -  09 Dec 2023 08:49  --------------------------------------------------------  IN:    Heparin Infusion: 15 mL    Oral Fluid: 50 mL  Total IN: 65 mL    OUT:  Total OUT: 0 mL    Total NET: 65 mL          ABP: --  ABP(mean): --  CVP(mm Hg): --  CVP(cm H2O): --  CO: --  CI: --  PA: --  PA(mean): --  PA(direct): --  PCWP: --  LA: --  RA: --  SVR: --  SVRI: --  PVR: --  PVRI: --    General Appearance: no acute distress	  Cardiovascular: regular rate and rhythm S1 S2, No JVD, No murmurs  Respiratory: clear bilaterally  Gastrointestinal:  soft, non-tender  Skin/Integumen: no edema  Neurologic: non focal  Musculoskeletal/ extremities: warm  Vascular: Peripheral pulses palpable 2+ bilaterally    LABS:	 	                        13.8   7.22  )-----------( 193      ( 09 Dec 2023 04:43 )             41.7     12-09    142  |  106  |  9<L>  ----------------------------<  100<H>  3.9   |  22  |  0.7    Ca    9.4      09 Dec 2023 04:43  Mg     2.3     12-09    TPro  6.4  /  Alb  4.0  /  TBili  2.9<H>  /  DBili  x   /  AST  122<H>  /  ALT  32  /  AlkPhos  79  12-09    PT/INR - ( 09 Dec 2023 04:04 )   PT: 11.50 sec;   INR: 1.01 ratio         PTT - ( 09 Dec 2023 04:04 )  PTT:39.8 sec    Troponin T, Serum: 2.40 ng/mL (12-09-23 @ 04:43)  Troponin T, Serum: 2.41 ng/mL (12-08-23 @ 21:27)  Troponin T, Serum: 1.22 ng/mL (12-08-23 @ 15:31)  Troponin T, Serum: 0.82 ng/mL (12-08-23 @ 13:00)          CARDIAC TESTING:  ECG:  	  Telemetry:  Echocardiogram:  Coronary angiogram:  RHC:  Stress test:  cMRI:    OTHER DIAGNOSTIC TESTING:  CXR:  CT:   Cardiology Attending Note    VIBHA RICHARDS  MRN-[id number]    Date of Admission: 12-08-23    Brief HPI: 82 y/o female PMH HTN presenting with chest pain and found to have NSTEMI.    24 hr events:  NAEON    artificial  tears Solution 1 Drop(s) Both EYES every 12 hours  aspirin enteric coated 81 milliGRAM(s) Oral daily  atorvastatin 80 milliGRAM(s) Oral at bedtime  chlorhexidine 2% Cloths 1 Application(s) Topical <User Schedule>  clopidogrel Tablet 75 milliGRAM(s) Oral daily  famotidine    Tablet 20 milliGRAM(s) Oral daily  heparin   Injectable 3200 Unit(s) IV Push every 6 hours PRN  heparin  Infusion.  Unit(s)/Hr IV Continuous <Continuous>  metoprolol tartrate 12.5 milliGRAM(s) Oral every 12 hours      PHYSICAL EXAM:  T(C): 36.4 (12-09-23 @ 08:00), Max: 37.1 (12-09-23 @ 04:00)  T(F): 97.5 (12-09-23 @ 08:00), Max: 98.8 (12-09-23 @ 04:00)  HR: 62 (12-09-23 @ 08:00) (55 - 73)  BP: 128/85 (12-09-23 @ 08:00) (107/60 - 137/65)  RR: 19 (12-09-23 @ 08:00) (15 - 20)  SpO2: 95% (12-09-23 @ 08:00) (93% - 100%)  Height (cm): 154.9 (12-08-23 @ 20:00)  Weight (kg): 52.2 (12-08-23 @ 20:00)  BSA (m2): 1.49 (12-08-23 @ 20:00)    12-08-23 @ 07:01  -  12-09-23 @ 07:00  --------------------------------------------------------  IN: 87.5 mL / OUT: 750 mL / NET: -662.5 mL    12-09-23 @ 07:01  -  12-09-23 @ 08:49  --------------------------------------------------------  IN: 65 mL / OUT: 0 mL / NET: 65 mL      I&O's Detail    08 Dec 2023 07:01  -  09 Dec 2023 07:00  --------------------------------------------------------  IN:    Heparin Infusion: 13 mL    Heparin Infusion: 74.5 mL  Total IN: 87.5 mL    OUT:    Voided (mL): 750 mL  Total OUT: 750 mL    Total NET: -662.5 mL      09 Dec 2023 07:01  -  09 Dec 2023 08:49  --------------------------------------------------------  IN:    Heparin Infusion: 15 mL    Oral Fluid: 50 mL  Total IN: 65 mL    OUT:  Total OUT: 0 mL    Total NET: 65 mL    General Appearance: no acute distress	  Cardiovascular: regular rate and rhythm S1 S2, No JVD, No murmurs  Respiratory: clear bilaterally  Gastrointestinal:  soft, non-tender  Skin/Integumen: no edema  Neurologic: non focal  Musculoskeletal/ extremities: warm  Vascular: Peripheral pulses palpable 2+ bilaterally    LABS:	 	                        13.8   7.22  )-----------( 193      ( 09 Dec 2023 04:43 )             41.7     12-09    142  |  106  |  9<L>  ----------------------------<  100<H>  3.9   |  22  |  0.7    Ca    9.4      09 Dec 2023 04:43  Mg     2.3     12-09    TPro  6.4  /  Alb  4.0  /  TBili  2.9<H>  /  DBili  x   /  AST  122<H>  /  ALT  32  /  AlkPhos  79  12-09    PT/INR - ( 09 Dec 2023 04:04 )   PT: 11.50 sec;   INR: 1.01 ratio         PTT - ( 09 Dec 2023 04:04 )  PTT:39.8 sec    Troponin T, Serum: 2.40 ng/mL (12-09-23 @ 04:43)  Troponin T, Serum: 2.41 ng/mL (12-08-23 @ 21:27)  Troponin T, Serum: 1.22 ng/mL (12-08-23 @ 15:31)  Troponin T, Serum: 0.82 ng/mL (12-08-23 @ 13:00)          CARDIAC TESTING:  ECG: HR 59, sinus, q wave inferiorly with slight CATIE, poor R wave progression  	  Telemetry:  Echocardiogram: pending read  Coronary angiogram:  RHC:  Stress test:  cMRI:    OTHER DIAGNOSTIC TESTING:  CXR: no acute pathology  CT:   Cardiology Attending Note    VIBHA RICHARDS  MRN-[id number]    Date of Admission: 12-08-23    Brief HPI: 84 y/o female PMH HTN presenting with chest pain and found to have NSTEMI.    24 hr events:  NAEON    artificial  tears Solution 1 Drop(s) Both EYES every 12 hours  aspirin enteric coated 81 milliGRAM(s) Oral daily  atorvastatin 80 milliGRAM(s) Oral at bedtime  chlorhexidine 2% Cloths 1 Application(s) Topical <User Schedule>  clopidogrel Tablet 75 milliGRAM(s) Oral daily  famotidine    Tablet 20 milliGRAM(s) Oral daily  heparin   Injectable 3200 Unit(s) IV Push every 6 hours PRN  heparin  Infusion.  Unit(s)/Hr IV Continuous <Continuous>  metoprolol tartrate 12.5 milliGRAM(s) Oral every 12 hours      PHYSICAL EXAM:  T(C): 36.4 (12-09-23 @ 08:00), Max: 37.1 (12-09-23 @ 04:00)  T(F): 97.5 (12-09-23 @ 08:00), Max: 98.8 (12-09-23 @ 04:00)  HR: 62 (12-09-23 @ 08:00) (55 - 73)  BP: 128/85 (12-09-23 @ 08:00) (107/60 - 137/65)  RR: 19 (12-09-23 @ 08:00) (15 - 20)  SpO2: 95% (12-09-23 @ 08:00) (93% - 100%)  Height (cm): 154.9 (12-08-23 @ 20:00)  Weight (kg): 52.2 (12-08-23 @ 20:00)  BSA (m2): 1.49 (12-08-23 @ 20:00)    12-08-23 @ 07:01  -  12-09-23 @ 07:00  --------------------------------------------------------  IN: 87.5 mL / OUT: 750 mL / NET: -662.5 mL    12-09-23 @ 07:01  -  12-09-23 @ 08:49  --------------------------------------------------------  IN: 65 mL / OUT: 0 mL / NET: 65 mL      I&O's Detail    08 Dec 2023 07:01  -  09 Dec 2023 07:00  --------------------------------------------------------  IN:    Heparin Infusion: 13 mL    Heparin Infusion: 74.5 mL  Total IN: 87.5 mL    OUT:    Voided (mL): 750 mL  Total OUT: 750 mL    Total NET: -662.5 mL      09 Dec 2023 07:01  -  09 Dec 2023 08:49  --------------------------------------------------------  IN:    Heparin Infusion: 15 mL    Oral Fluid: 50 mL  Total IN: 65 mL    OUT:  Total OUT: 0 mL    Total NET: 65 mL    General Appearance: no acute distress	  Cardiovascular: regular rate and rhythm S1 S2, No JVD, No murmurs  Respiratory: clear bilaterally  Gastrointestinal:  soft, non-tender  Skin/Integumen: no edema  Neurologic: non focal  Musculoskeletal/ extremities: warm  Vascular: Peripheral pulses palpable 2+ bilaterally    LABS:	 	                        13.8   7.22  )-----------( 193      ( 09 Dec 2023 04:43 )             41.7     12-09    142  |  106  |  9<L>  ----------------------------<  100<H>  3.9   |  22  |  0.7    Ca    9.4      09 Dec 2023 04:43  Mg     2.3     12-09    TPro  6.4  /  Alb  4.0  /  TBili  2.9<H>  /  DBili  x   /  AST  122<H>  /  ALT  32  /  AlkPhos  79  12-09    PT/INR - ( 09 Dec 2023 04:04 )   PT: 11.50 sec;   INR: 1.01 ratio         PTT - ( 09 Dec 2023 04:04 )  PTT:39.8 sec    Troponin T, Serum: 2.40 ng/mL (12-09-23 @ 04:43)  Troponin T, Serum: 2.41 ng/mL (12-08-23 @ 21:27)  Troponin T, Serum: 1.22 ng/mL (12-08-23 @ 15:31)  Troponin T, Serum: 0.82 ng/mL (12-08-23 @ 13:00)          CARDIAC TESTING:  ECG: HR 59, sinus, q wave inferiorly with slight CATIE, poor R wave progression  	  Telemetry:  Echocardiogram: pending read  Coronary angiogram:  RHC:  Stress test:  cMRI:    OTHER DIAGNOSTIC TESTING:  CXR: no acute pathology  CT:   Cardiology Attending Note    VIBHA RICHARDS  MRN-[id number]    Date of Admission: 12-08-23    Brief HPI: 82 y/o female PMH HTN presenting with chest pain and found to have NSTEMI.    24 hr events:  NAEON. No complaints    artificial  tears Solution 1 Drop(s) Both EYES every 12 hours  aspirin enteric coated 81 milliGRAM(s) Oral daily  atorvastatin 80 milliGRAM(s) Oral at bedtime  chlorhexidine 2% Cloths 1 Application(s) Topical <User Schedule>  clopidogrel Tablet 75 milliGRAM(s) Oral daily  famotidine    Tablet 20 milliGRAM(s) Oral daily  heparin   Injectable 3200 Unit(s) IV Push every 6 hours PRN  heparin  Infusion.  Unit(s)/Hr IV Continuous <Continuous>  metoprolol tartrate 12.5 milliGRAM(s) Oral every 12 hours      PHYSICAL EXAM:  T(C): 36.4 (12-09-23 @ 08:00), Max: 37.1 (12-09-23 @ 04:00)  T(F): 97.5 (12-09-23 @ 08:00), Max: 98.8 (12-09-23 @ 04:00)  HR: 62 (12-09-23 @ 08:00) (55 - 73)  BP: 128/85 (12-09-23 @ 08:00) (107/60 - 137/65)  RR: 19 (12-09-23 @ 08:00) (15 - 20)  SpO2: 95% (12-09-23 @ 08:00) (93% - 100%)  Height (cm): 154.9 (12-08-23 @ 20:00)  Weight (kg): 52.2 (12-08-23 @ 20:00)  BSA (m2): 1.49 (12-08-23 @ 20:00)    12-08-23 @ 07:01  -  12-09-23 @ 07:00  --------------------------------------------------------  IN: 87.5 mL / OUT: 750 mL / NET: -662.5 mL    12-09-23 @ 07:01  -  12-09-23 @ 08:49  --------------------------------------------------------  IN: 65 mL / OUT: 0 mL / NET: 65 mL      I&O's Detail    08 Dec 2023 07:01  -  09 Dec 2023 07:00  --------------------------------------------------------  IN:    Heparin Infusion: 13 mL    Heparin Infusion: 74.5 mL  Total IN: 87.5 mL    OUT:    Voided (mL): 750 mL  Total OUT: 750 mL    Total NET: -662.5 mL      09 Dec 2023 07:01  -  09 Dec 2023 08:49  --------------------------------------------------------  IN:    Heparin Infusion: 15 mL    Oral Fluid: 50 mL  Total IN: 65 mL    OUT:  Total OUT: 0 mL    Total NET: 65 mL    General Appearance: no acute distress	  Cardiovascular: regular rate and rhythm S1 S2, No JVD, No murmurs  Respiratory: clear bilaterally  Gastrointestinal:  soft, non-tender  Skin/Integumen: no edema  Neurologic: non focal  Musculoskeletal/ extremities: warm  Vascular: Peripheral pulses palpable 2+ bilaterally    LABS:	 	                        13.8   7.22  )-----------( 193      ( 09 Dec 2023 04:43 )             41.7     12-09    142  |  106  |  9<L>  ----------------------------<  100<H>  3.9   |  22  |  0.7    Ca    9.4      09 Dec 2023 04:43  Mg     2.3     12-09    TPro  6.4  /  Alb  4.0  /  TBili  2.9<H>  /  DBili  x   /  AST  122<H>  /  ALT  32  /  AlkPhos  79  12-09    PT/INR - ( 09 Dec 2023 04:04 )   PT: 11.50 sec;   INR: 1.01 ratio         PTT - ( 09 Dec 2023 04:04 )  PTT:39.8 sec    Troponin T, Serum: 2.40 ng/mL (12-09-23 @ 04:43)  Troponin T, Serum: 2.41 ng/mL (12-08-23 @ 21:27)  Troponin T, Serum: 1.22 ng/mL (12-08-23 @ 15:31)  Troponin T, Serum: 0.82 ng/mL (12-08-23 @ 13:00)          CARDIAC TESTING:  ECG: HR 59, sinus, q wave inferiorly with slight CATIE, poor R wave progression  	  Telemetry:  Echocardiogram: pending formal read but normal appearing LV function with possible WMA in basal inferior/inferoseptal wall  Coronary angiogram:  RHC:  Stress test:  cMRI:    OTHER DIAGNOSTIC TESTING:  CXR: no acute pathology  CT:   Cardiology Attending Note    VIBHA RICHARDS  MRN-[id number]    Date of Admission: 12-08-23    Brief HPI: 84 y/o female PMH HTN presenting with chest pain and found to have NSTEMI.    24 hr events:  NAEON. No complaints    artificial  tears Solution 1 Drop(s) Both EYES every 12 hours  aspirin enteric coated 81 milliGRAM(s) Oral daily  atorvastatin 80 milliGRAM(s) Oral at bedtime  chlorhexidine 2% Cloths 1 Application(s) Topical <User Schedule>  clopidogrel Tablet 75 milliGRAM(s) Oral daily  famotidine    Tablet 20 milliGRAM(s) Oral daily  heparin   Injectable 3200 Unit(s) IV Push every 6 hours PRN  heparin  Infusion.  Unit(s)/Hr IV Continuous <Continuous>  metoprolol tartrate 12.5 milliGRAM(s) Oral every 12 hours      PHYSICAL EXAM:  T(C): 36.4 (12-09-23 @ 08:00), Max: 37.1 (12-09-23 @ 04:00)  T(F): 97.5 (12-09-23 @ 08:00), Max: 98.8 (12-09-23 @ 04:00)  HR: 62 (12-09-23 @ 08:00) (55 - 73)  BP: 128/85 (12-09-23 @ 08:00) (107/60 - 137/65)  RR: 19 (12-09-23 @ 08:00) (15 - 20)  SpO2: 95% (12-09-23 @ 08:00) (93% - 100%)  Height (cm): 154.9 (12-08-23 @ 20:00)  Weight (kg): 52.2 (12-08-23 @ 20:00)  BSA (m2): 1.49 (12-08-23 @ 20:00)    12-08-23 @ 07:01  -  12-09-23 @ 07:00  --------------------------------------------------------  IN: 87.5 mL / OUT: 750 mL / NET: -662.5 mL    12-09-23 @ 07:01  -  12-09-23 @ 08:49  --------------------------------------------------------  IN: 65 mL / OUT: 0 mL / NET: 65 mL      I&O's Detail    08 Dec 2023 07:01  -  09 Dec 2023 07:00  --------------------------------------------------------  IN:    Heparin Infusion: 13 mL    Heparin Infusion: 74.5 mL  Total IN: 87.5 mL    OUT:    Voided (mL): 750 mL  Total OUT: 750 mL    Total NET: -662.5 mL      09 Dec 2023 07:01  -  09 Dec 2023 08:49  --------------------------------------------------------  IN:    Heparin Infusion: 15 mL    Oral Fluid: 50 mL  Total IN: 65 mL    OUT:  Total OUT: 0 mL    Total NET: 65 mL    General Appearance: no acute distress	  Cardiovascular: regular rate and rhythm S1 S2, No JVD, No murmurs  Respiratory: clear bilaterally  Gastrointestinal:  soft, non-tender  Skin/Integumen: no edema  Neurologic: non focal  Musculoskeletal/ extremities: warm  Vascular: Peripheral pulses palpable 2+ bilaterally    LABS:	 	                        13.8   7.22  )-----------( 193      ( 09 Dec 2023 04:43 )             41.7     12-09    142  |  106  |  9<L>  ----------------------------<  100<H>  3.9   |  22  |  0.7    Ca    9.4      09 Dec 2023 04:43  Mg     2.3     12-09    TPro  6.4  /  Alb  4.0  /  TBili  2.9<H>  /  DBili  x   /  AST  122<H>  /  ALT  32  /  AlkPhos  79  12-09    PT/INR - ( 09 Dec 2023 04:04 )   PT: 11.50 sec;   INR: 1.01 ratio         PTT - ( 09 Dec 2023 04:04 )  PTT:39.8 sec    Troponin T, Serum: 2.40 ng/mL (12-09-23 @ 04:43)  Troponin T, Serum: 2.41 ng/mL (12-08-23 @ 21:27)  Troponin T, Serum: 1.22 ng/mL (12-08-23 @ 15:31)  Troponin T, Serum: 0.82 ng/mL (12-08-23 @ 13:00)          CARDIAC TESTING:  ECG: HR 59, sinus, q wave inferiorly with slight CATIE, poor R wave progression  	  Telemetry:  Echocardiogram: pending formal read but normal appearing LV function with possible WMA in basal inferior/inferoseptal wall  Coronary angiogram:  RHC:  Stress test:  cMRI:    OTHER DIAGNOSTIC TESTING:  CXR: no acute pathology  CT:

## 2023-12-09 NOTE — PROGRESS NOTE ADULT - SUBJECTIVE AND OBJECTIVE BOX
24H events:    Patient is a 83y old Female who presents with a chief complaint of NSTEMI (08 Dec 2023 15:14)    Primary diagnosis of NSTEMI (non-ST elevation myocardial infarction)    Today is hospital day 1d. This morning patient was seen and examined at bedside, resting comfortably in bed.    No acute or major events overnight.    Code Status: Full    PAST MEDICAL & SURGICAL HISTORY  Hypertension    History of back surgery  age 28 - disc herniation surgery      SOCIAL HISTORY:  Social History:    never smoker  no needs at baseline, active daily (08 Dec 2023 15:14)      ALLERGIES:  No Known Allergies    MEDICATIONS:  STANDING MEDICATIONS  artificial  tears Solution 1 Drop(s) Both EYES every 12 hours  aspirin enteric coated 81 milliGRAM(s) Oral daily  atorvastatin 80 milliGRAM(s) Oral at bedtime  chlorhexidine 2% Cloths 1 Application(s) Topical <User Schedule>  clopidogrel Tablet 75 milliGRAM(s) Oral daily  famotidine    Tablet 20 milliGRAM(s) Oral daily  heparin  Infusion.  Unit(s)/Hr IV Continuous <Continuous>  metoprolol tartrate 12.5 milliGRAM(s) Oral every 12 hours    PRN MEDICATIONS  heparin   Injectable 3200 Unit(s) IV Push every 6 hours PRN    VITALS:   T(F): 98.8  HR: 60  BP: 109/63  RR: 18  SpO2: 96%    PHYSICAL EXAM:  GENERAL:   ( x ) NAD, lying in bed comfortably     (  ) obtunded     (  ) lethargic     (  ) somnolent    HEAD:   ( x ) Atraumatic     (  ) hematoma     (  ) laceration (specify location:       )     NECK:  ( x ) Supple     (  ) neck stiffness     (  ) nuchal rigidity     (  )  no JVD     (  ) JVD present ( -- cm)    HEART:  Rate -->     ( x ) normal rate     (  ) bradycardic     (  ) tachycardic  Rhythm -->     ( x ) regular     (  ) regularly irregular     (  ) irregularly irregular  Murmurs -->     ( x ) normal s1s2     (  ) systolic murmur     (  ) diastolic murmur     (  ) continuous murmur      (  ) S3 present     (  ) S4 present    LUNGS:   ( x ) Unlabored respirations     (  ) tachypnea  ( x ) B/L air entry     (  ) decreased breath sounds in:  (location     )    (  ) no adventitious sound     (  ) crackles     (  ) wheezing      (  ) rhonchi      (specify location:       )  (  ) chest wall tenderness (specify location:       )    ABDOMEN:   ( x ) Soft     (  ) tense   |   ( x ) nondistended     (  ) distended   |   (  ) +BS     (  ) hypoactive bowel sounds     (  ) hyperactive bowel sounds  ( x ) nontender     (  ) RUQ tenderness     (  ) RLQ tenderness     (  ) LLQ tenderness     (  ) epigastric tenderness     (  ) diffuse tenderness  (  ) Splenomegaly      (  ) Hepatomegaly      (  ) Jaundice     (  ) ecchymosis     EXTREMITIES:  (  ) Normal     (  ) Rash     (  ) ecchymosis     (  ) varicose veins      (  ) pitting edema     (  ) non-pitting edema   (  ) ulceration     (  ) gangrene:     (location:     )    NERVOUS SYSTEM:    ( x ) A&Ox3     (  ) confused     (  ) lethargic    SKIN:   (  ) No rashes or lesions     (  ) maculopapular rash     (  ) pustules     (  ) vesicles     (  ) ulcer     (  ) ecchymosis     (specify location:     )    LABS:                        13.8   7.22  )-----------( 193      ( 09 Dec 2023 04:43 )             41.7     12-09    142  |  106  |  9<L>  ----------------------------<  100<H>  3.9   |  22  |  0.7    Ca    9.4      09 Dec 2023 04:43  Mg     2.3     12-09    TPro  6.4  /  Alb  4.0  /  TBili  2.9<H>  /  DBili  x   /  AST  122<H>  /  ALT  32  /  AlkPhos  79  12-09    PT/INR - ( 09 Dec 2023 04:04 )   PT: 11.50 sec;   INR: 1.01 ratio         PTT - ( 09 Dec 2023 04:04 )  PTT:39.8 sec  Urinalysis Basic - ( 09 Dec 2023 04:43 )    Color: x / Appearance: x / SG: x / pH: x  Gluc: 100 mg/dL / Ketone: x  / Bili: x / Urobili: x   Blood: x / Protein: x / Nitrite: x   Leuk Esterase: x / RBC: x / WBC x   Sq Epi: x / Non Sq Epi: x / Bacteria: x        Troponin T, Serum: 2.41 ng/mL *HH* (12-08-23 @ 21:27)  Troponin T, Serum: 1.22 ng/mL *HH* (12-08-23 @ 15:31)  Troponin T, Serum: 0.82 ng/mL *HH* (12-08-23 @ 13:00)      CARDIAC MARKERS ( 08 Dec 2023 21:27 )  x     / 2.41 ng/mL / x     / x     / x      CARDIAC MARKERS ( 08 Dec 2023 15:31 )  x     / 1.22 ng/mL / x     / x     / x      CARDIAC MARKERS ( 08 Dec 2023 13:00 )  x     / 0.82 ng/mL / x     / x     / x             24H events:    Patient is a 83y old Female who presents with a chief complaint of NSTEMI (08 Dec 2023 15:14)    Primary diagnosis of NSTEMI (non-ST elevation myocardial infarction)    Today is hospital day 1d. This morning patient was seen and examined at bedside, resting comfortably in bed.    No acute or major events overnight.    Code Status: Full    PAST MEDICAL & SURGICAL HISTORY  Hypertension    History of back surgery  age 28 - disc herniation surgery      SOCIAL HISTORY:  Social History:    never smoker  no needs at baseline, active daily (08 Dec 2023 15:14)      ALLERGIES:  No Known Allergies    MEDICATIONS:  STANDING MEDICATIONS  artificial  tears Solution 1 Drop(s) Both EYES every 12 hours  aspirin enteric coated 81 milliGRAM(s) Oral daily  atorvastatin 80 milliGRAM(s) Oral at bedtime  chlorhexidine 2% Cloths 1 Application(s) Topical <User Schedule>  clopidogrel Tablet 75 milliGRAM(s) Oral daily  famotidine    Tablet 20 milliGRAM(s) Oral daily  heparin  Infusion.  Unit(s)/Hr IV Continuous <Continuous>  metoprolol tartrate 12.5 milliGRAM(s) Oral every 12 hours    PRN MEDICATIONS  heparin   Injectable 3200 Unit(s) IV Push every 6 hours PRN    VITALS:   T(F): 98.8  HR: 60  BP: 109/63  RR: 18  SpO2: 96%    PHYSICAL EXAM:  GENERAL:   ( x ) NAD, lying in bed comfortably     (  ) obtunded     (  ) lethargic     (  ) somnolent    HEAD:   ( x ) Atraumatic     (  ) hematoma     (  ) laceration (specify location:       )     NECK:  ( x ) Supple     (  ) neck stiffness     (  ) nuchal rigidity     (  )  no JVD     (  ) JVD present ( -- cm)    HEART:  Rate -->     ( x ) normal rate     (  ) bradycardic     (  ) tachycardic  Rhythm -->     ( x ) regular     (  ) regularly irregular     (  ) irregularly irregular  Murmurs -->     ( x ) normal s1s2     (  ) systolic murmur     (  ) diastolic murmur     (  ) continuous murmur      (  ) S3 present     (  ) S4 present    LUNGS:   ( x ) Unlabored respirations     (  ) tachypnea  ( x ) B/L air entry     (  ) decreased breath sounds in:  (location     )    (  ) no adventitious sound     (  ) crackles     (  ) wheezing      (  ) rhonchi      (specify location:       )  (  ) chest wall tenderness (specify location:       )    ABDOMEN:   ( x ) Soft     (  ) tense   |   ( x ) nondistended     (  ) distended   |   (  ) +BS     (  ) hypoactive bowel sounds     (  ) hyperactive bowel sounds  ( x ) nontender     (  ) RUQ tenderness     (  ) RLQ tenderness     (  ) LLQ tenderness     (  ) epigastric tenderness     (  ) diffuse tenderness  (  ) Splenomegaly      (  ) Hepatomegaly      (  ) Jaundice     (  ) ecchymosis     EXTREMITIES:  ( x ) Normal     (  ) Rash     (  ) ecchymosis     (  ) varicose veins      (  ) pitting edema     (  ) non-pitting edema   (  ) ulceration     (  ) gangrene:     (location:     )    NERVOUS SYSTEM:    ( x ) A&Ox3     (  ) confused     (  ) lethargic    SKIN:   ( x ) No rashes or lesions     (  ) maculopapular rash     (  ) pustules     (  ) vesicles     (  ) ulcer     (  ) ecchymosis     (specify location:     )    LABS:                        13.8   7.22  )-----------( 193      ( 09 Dec 2023 04:43 )             41.7     12-09    142  |  106  |  9<L>  ----------------------------<  100<H>  3.9   |  22  |  0.7    Ca    9.4      09 Dec 2023 04:43  Mg     2.3     12-09    TPro  6.4  /  Alb  4.0  /  TBili  2.9<H>  /  DBili  x   /  AST  122<H>  /  ALT  32  /  AlkPhos  79  12-09    PT/INR - ( 09 Dec 2023 04:04 )   PT: 11.50 sec;   INR: 1.01 ratio         PTT - ( 09 Dec 2023 04:04 )  PTT:39.8 sec  Urinalysis Basic - ( 09 Dec 2023 04:43 )    Color: x / Appearance: x / SG: x / pH: x  Gluc: 100 mg/dL / Ketone: x  / Bili: x / Urobili: x   Blood: x / Protein: x / Nitrite: x   Leuk Esterase: x / RBC: x / WBC x   Sq Epi: x / Non Sq Epi: x / Bacteria: x        Troponin T, Serum: 2.41 ng/mL *HH* (12-08-23 @ 21:27)  Troponin T, Serum: 1.22 ng/mL ** (12-08-23 @ 15:31)  Troponin T, Serum: 0.82 ng/mL *HH* (12-08-23 @ 13:00)      CARDIAC MARKERS ( 08 Dec 2023 21:27 )  x     / 2.41 ng/mL / x     / x     / x      CARDIAC MARKERS ( 08 Dec 2023 15:31 )  x     / 1.22 ng/mL / x     / x     / x      CARDIAC MARKERS ( 08 Dec 2023 13:00 )  x     / 0.82 ng/mL / x     / x     / x

## 2023-12-09 NOTE — PROGRESS NOTE ADULT - ASSESSMENT
82 y/o female PMH HTN presenting with chest pain and found to have NSTEMI.    #NSTEMI  #HTN    Plan:  -LHC/coronary angiogram on Monday  -Continue ASA/Plavix  -Continue heparin gtt for at least 48 hours  -Continue metoprolol tartrate 12.5 mg BID  -Continue atorvastatin 80 mg       Chase Saha MD  Interventional Cardiology/Advanced Heart Failure Transplant   84 y/o female PMH HTN presenting with chest pain and found to have NSTEMI.    #NSTEMI  #HTN    Plan:  -LHC/coronary angiogram on Monday  -Continue ASA/Plavix  -Continue heparin gtt for at least 48 hours  -Continue metoprolol tartrate 12.5 mg BID  -Continue atorvastatin 80 mg       Chase Saha MD  Interventional Cardiology/Advanced Heart Failure Transplant   82 y/o female PMH HTN presenting with chest pain and found to have NSTEMI. She does not have any significant risk factors including negative family history. At this time she is chest pain free but given significant troponin elevation will plan for LHC/coronary angiogram on Monday. If patient develops symptoms then may need to proceed with angiogram sooner. We will continue with ACS treatment in the meantime.    #NSTEMI  #HTN    Plan:  -LHC/coronary angiogram on Monday  -Continue ASA/Plavix  -Continue heparin gtt for at least 48 hours  -Continue metoprolol tartrate 12.5 mg BID  -Continue atorvastatin 80 mg   -Check lipid panel    Chase Saha MD  Interventional Cardiology/Advanced Heart Failure Transplant

## 2023-12-09 NOTE — PROGRESS NOTE ADULT - ASSESSMENT
IMPRESSION:    #NSTEMI  #HTN    PLAN:    CNS  - avoid oversedation  - considering age would encourage good sleep/wake cycle; frequent reorientation    HEENT  - oral care    PULM  - HOB 45 degrees  - on room air  - CXR: No radiographic evidence of cardiopulmonary disease    CARDIOVASCULAR  - lipid panel  - statin  - hold amlodipine for now  - trend CE  - serial EKG  - TTE w/ con    GI  - tolerating feeds, having BMs  - f/u CMP  - hepatic panel  - RUQ US    RENAL  - replete lytes K>4 Mg>2.1 as needed    ID  - observe off abx    HEME   - trend h/h while on AC/AP    ENDO  - a1c  - TSH  - insulin protocol as needed    MSK  - Increase as tolerated IMPRESSION:    #NSTEMI  #HTN    PLAN:    CNS  - avoid oversedation  - considering age would encourage good sleep/wake cycle; frequent reorientation    HEENT  - oral care    PULM  - HOB 45 degrees  - on room air  - CXR: No radiographic evidence of cardiopulmonary disease    CARDIOVASCULAR  - Troponin 0.82 > 1.22 > 2.41 > 2.40  - c/w ASA/Plavix  - c/w atorvastatin 80 mg  - c/w metoprolol tartrate 12.5 mg BID  - Continue heparin gtt for at least 48 hours  - hold amlodipine for now  - serial EKG  - TTE w/ con  - f/u lipid panel  - LHC/coronary angiogram on Monday    GI  - tolerating feeds, having BMs  -  trend  - hepatic panel  - RUQ US    RENAL  - replete lytes K>4 Mg>2.1 as needed    ID  - observe off abx    HEME   - trend h/h while on AC/AP    ENDO  - a1c  - TSH  - insulin protocol as needed    MSK  - Increase as tolerated

## 2023-12-10 LAB
ALBUMIN SERPL ELPH-MCNC: 4 G/DL — SIGNIFICANT CHANGE UP (ref 3.5–5.2)
ALBUMIN SERPL ELPH-MCNC: 4 G/DL — SIGNIFICANT CHANGE UP (ref 3.5–5.2)
ALP SERPL-CCNC: 73 U/L — SIGNIFICANT CHANGE UP (ref 30–115)
ALP SERPL-CCNC: 73 U/L — SIGNIFICANT CHANGE UP (ref 30–115)
ALT FLD-CCNC: 26 U/L — SIGNIFICANT CHANGE UP (ref 0–41)
ALT FLD-CCNC: 26 U/L — SIGNIFICANT CHANGE UP (ref 0–41)
ANION GAP SERPL CALC-SCNC: 9 MMOL/L — SIGNIFICANT CHANGE UP (ref 7–14)
ANION GAP SERPL CALC-SCNC: 9 MMOL/L — SIGNIFICANT CHANGE UP (ref 7–14)
APTT BLD: 52.6 SEC — HIGH (ref 27–39.2)
APTT BLD: 52.6 SEC — HIGH (ref 27–39.2)
APTT BLD: 59.1 SEC — HIGH (ref 27–39.2)
APTT BLD: 59.1 SEC — HIGH (ref 27–39.2)
APTT BLD: 65.5 SEC — HIGH (ref 27–39.2)
APTT BLD: 65.5 SEC — HIGH (ref 27–39.2)
AST SERPL-CCNC: 59 U/L — HIGH (ref 0–41)
AST SERPL-CCNC: 59 U/L — HIGH (ref 0–41)
BASOPHILS # BLD AUTO: 0.05 K/UL — SIGNIFICANT CHANGE UP (ref 0–0.2)
BASOPHILS # BLD AUTO: 0.05 K/UL — SIGNIFICANT CHANGE UP (ref 0–0.2)
BASOPHILS NFR BLD AUTO: 0.7 % — SIGNIFICANT CHANGE UP (ref 0–1)
BASOPHILS NFR BLD AUTO: 0.7 % — SIGNIFICANT CHANGE UP (ref 0–1)
BILIRUB SERPL-MCNC: 2.2 MG/DL — HIGH (ref 0.2–1.2)
BILIRUB SERPL-MCNC: 2.2 MG/DL — HIGH (ref 0.2–1.2)
BUN SERPL-MCNC: 13 MG/DL — SIGNIFICANT CHANGE UP (ref 10–20)
BUN SERPL-MCNC: 13 MG/DL — SIGNIFICANT CHANGE UP (ref 10–20)
CALCIUM SERPL-MCNC: 9.1 MG/DL — SIGNIFICANT CHANGE UP (ref 8.4–10.5)
CALCIUM SERPL-MCNC: 9.1 MG/DL — SIGNIFICANT CHANGE UP (ref 8.4–10.5)
CHLORIDE SERPL-SCNC: 105 MMOL/L — SIGNIFICANT CHANGE UP (ref 98–110)
CHLORIDE SERPL-SCNC: 105 MMOL/L — SIGNIFICANT CHANGE UP (ref 98–110)
CHOLEST SERPL-MCNC: 119 MG/DL — SIGNIFICANT CHANGE UP
CHOLEST SERPL-MCNC: 119 MG/DL — SIGNIFICANT CHANGE UP
CO2 SERPL-SCNC: 24 MMOL/L — SIGNIFICANT CHANGE UP (ref 17–32)
CO2 SERPL-SCNC: 24 MMOL/L — SIGNIFICANT CHANGE UP (ref 17–32)
CREAT SERPL-MCNC: 0.9 MG/DL — SIGNIFICANT CHANGE UP (ref 0.7–1.5)
CREAT SERPL-MCNC: 0.9 MG/DL — SIGNIFICANT CHANGE UP (ref 0.7–1.5)
EGFR: 64 ML/MIN/1.73M2 — SIGNIFICANT CHANGE UP
EGFR: 64 ML/MIN/1.73M2 — SIGNIFICANT CHANGE UP
EOSINOPHIL # BLD AUTO: 0.06 K/UL — SIGNIFICANT CHANGE UP (ref 0–0.7)
EOSINOPHIL # BLD AUTO: 0.06 K/UL — SIGNIFICANT CHANGE UP (ref 0–0.7)
EOSINOPHIL NFR BLD AUTO: 0.8 % — SIGNIFICANT CHANGE UP (ref 0–8)
EOSINOPHIL NFR BLD AUTO: 0.8 % — SIGNIFICANT CHANGE UP (ref 0–8)
GLUCOSE SERPL-MCNC: 108 MG/DL — HIGH (ref 70–99)
GLUCOSE SERPL-MCNC: 108 MG/DL — HIGH (ref 70–99)
HCT VFR BLD CALC: 40.1 % — SIGNIFICANT CHANGE UP (ref 37–47)
HCT VFR BLD CALC: 40.1 % — SIGNIFICANT CHANGE UP (ref 37–47)
HDLC SERPL-MCNC: 57 MG/DL — SIGNIFICANT CHANGE UP
HDLC SERPL-MCNC: 57 MG/DL — SIGNIFICANT CHANGE UP
HGB BLD-MCNC: 13.5 G/DL — SIGNIFICANT CHANGE UP (ref 12–16)
HGB BLD-MCNC: 13.5 G/DL — SIGNIFICANT CHANGE UP (ref 12–16)
IMM GRANULOCYTES NFR BLD AUTO: 0.4 % — HIGH (ref 0.1–0.3)
IMM GRANULOCYTES NFR BLD AUTO: 0.4 % — HIGH (ref 0.1–0.3)
LIPID PNL WITH DIRECT LDL SERPL: 46 MG/DL — SIGNIFICANT CHANGE UP
LIPID PNL WITH DIRECT LDL SERPL: 46 MG/DL — SIGNIFICANT CHANGE UP
LYMPHOCYTES # BLD AUTO: 1.6 K/UL — SIGNIFICANT CHANGE UP (ref 1.2–3.4)
LYMPHOCYTES # BLD AUTO: 1.6 K/UL — SIGNIFICANT CHANGE UP (ref 1.2–3.4)
LYMPHOCYTES # BLD AUTO: 21.3 % — SIGNIFICANT CHANGE UP (ref 20.5–51.1)
LYMPHOCYTES # BLD AUTO: 21.3 % — SIGNIFICANT CHANGE UP (ref 20.5–51.1)
MAGNESIUM SERPL-MCNC: 2.4 MG/DL — SIGNIFICANT CHANGE UP (ref 1.8–2.4)
MAGNESIUM SERPL-MCNC: 2.4 MG/DL — SIGNIFICANT CHANGE UP (ref 1.8–2.4)
MCHC RBC-ENTMCNC: 27.8 PG — SIGNIFICANT CHANGE UP (ref 27–31)
MCHC RBC-ENTMCNC: 27.8 PG — SIGNIFICANT CHANGE UP (ref 27–31)
MCHC RBC-ENTMCNC: 33.7 G/DL — SIGNIFICANT CHANGE UP (ref 32–37)
MCHC RBC-ENTMCNC: 33.7 G/DL — SIGNIFICANT CHANGE UP (ref 32–37)
MCV RBC AUTO: 82.7 FL — SIGNIFICANT CHANGE UP (ref 81–99)
MCV RBC AUTO: 82.7 FL — SIGNIFICANT CHANGE UP (ref 81–99)
MONOCYTES # BLD AUTO: 0.8 K/UL — HIGH (ref 0.1–0.6)
MONOCYTES # BLD AUTO: 0.8 K/UL — HIGH (ref 0.1–0.6)
MONOCYTES NFR BLD AUTO: 10.6 % — HIGH (ref 1.7–9.3)
MONOCYTES NFR BLD AUTO: 10.6 % — HIGH (ref 1.7–9.3)
NEUTROPHILS # BLD AUTO: 4.98 K/UL — SIGNIFICANT CHANGE UP (ref 1.4–6.5)
NEUTROPHILS # BLD AUTO: 4.98 K/UL — SIGNIFICANT CHANGE UP (ref 1.4–6.5)
NEUTROPHILS NFR BLD AUTO: 66.2 % — SIGNIFICANT CHANGE UP (ref 42.2–75.2)
NEUTROPHILS NFR BLD AUTO: 66.2 % — SIGNIFICANT CHANGE UP (ref 42.2–75.2)
NON HDL CHOLESTEROL: 62 MG/DL — SIGNIFICANT CHANGE UP
NON HDL CHOLESTEROL: 62 MG/DL — SIGNIFICANT CHANGE UP
NRBC # BLD: 0 /100 WBCS — SIGNIFICANT CHANGE UP (ref 0–0)
NRBC # BLD: 0 /100 WBCS — SIGNIFICANT CHANGE UP (ref 0–0)
PLATELET # BLD AUTO: 176 K/UL — SIGNIFICANT CHANGE UP (ref 130–400)
PLATELET # BLD AUTO: 176 K/UL — SIGNIFICANT CHANGE UP (ref 130–400)
PMV BLD: 12.6 FL — HIGH (ref 7.4–10.4)
PMV BLD: 12.6 FL — HIGH (ref 7.4–10.4)
POTASSIUM SERPL-MCNC: 3.8 MMOL/L — SIGNIFICANT CHANGE UP (ref 3.5–5)
POTASSIUM SERPL-MCNC: 3.8 MMOL/L — SIGNIFICANT CHANGE UP (ref 3.5–5)
POTASSIUM SERPL-SCNC: 3.8 MMOL/L — SIGNIFICANT CHANGE UP (ref 3.5–5)
POTASSIUM SERPL-SCNC: 3.8 MMOL/L — SIGNIFICANT CHANGE UP (ref 3.5–5)
PROT SERPL-MCNC: 6.2 G/DL — SIGNIFICANT CHANGE UP (ref 6–8)
PROT SERPL-MCNC: 6.2 G/DL — SIGNIFICANT CHANGE UP (ref 6–8)
RBC # BLD: 4.85 M/UL — SIGNIFICANT CHANGE UP (ref 4.2–5.4)
RBC # BLD: 4.85 M/UL — SIGNIFICANT CHANGE UP (ref 4.2–5.4)
RBC # FLD: 13.4 % — SIGNIFICANT CHANGE UP (ref 11.5–14.5)
RBC # FLD: 13.4 % — SIGNIFICANT CHANGE UP (ref 11.5–14.5)
SODIUM SERPL-SCNC: 138 MMOL/L — SIGNIFICANT CHANGE UP (ref 135–146)
SODIUM SERPL-SCNC: 138 MMOL/L — SIGNIFICANT CHANGE UP (ref 135–146)
TRIGL SERPL-MCNC: 84 MG/DL — SIGNIFICANT CHANGE UP
TRIGL SERPL-MCNC: 84 MG/DL — SIGNIFICANT CHANGE UP
TROPONIN T SERPL-MCNC: 2.47 NG/ML — CRITICAL HIGH
TROPONIN T SERPL-MCNC: 2.47 NG/ML — CRITICAL HIGH
TROPONIN T SERPL-MCNC: 2.61 NG/ML — CRITICAL HIGH
TROPONIN T SERPL-MCNC: 2.61 NG/ML — CRITICAL HIGH
TROPONIN T SERPL-MCNC: 2.93 NG/ML — CRITICAL HIGH
TROPONIN T SERPL-MCNC: 2.93 NG/ML — CRITICAL HIGH
WBC # BLD: 7.52 K/UL — SIGNIFICANT CHANGE UP (ref 4.8–10.8)
WBC # BLD: 7.52 K/UL — SIGNIFICANT CHANGE UP (ref 4.8–10.8)
WBC # FLD AUTO: 7.52 K/UL — SIGNIFICANT CHANGE UP (ref 4.8–10.8)
WBC # FLD AUTO: 7.52 K/UL — SIGNIFICANT CHANGE UP (ref 4.8–10.8)

## 2023-12-10 PROCEDURE — 71045 X-RAY EXAM CHEST 1 VIEW: CPT | Mod: 26

## 2023-12-10 PROCEDURE — 93010 ELECTROCARDIOGRAM REPORT: CPT

## 2023-12-10 PROCEDURE — 99291 CRITICAL CARE FIRST HOUR: CPT

## 2023-12-10 RX ADMIN — CLOPIDOGREL BISULFATE 75 MILLIGRAM(S): 75 TABLET, FILM COATED ORAL at 11:38

## 2023-12-10 RX ADMIN — ATORVASTATIN CALCIUM 80 MILLIGRAM(S): 80 TABLET, FILM COATED ORAL at 21:01

## 2023-12-10 RX ADMIN — FAMOTIDINE 20 MILLIGRAM(S): 10 INJECTION INTRAVENOUS at 11:39

## 2023-12-10 RX ADMIN — Medication 12.5 MILLIGRAM(S): at 05:16

## 2023-12-10 RX ADMIN — HEPARIN SODIUM 850 UNIT(S)/HR: 5000 INJECTION INTRAVENOUS; SUBCUTANEOUS at 02:56

## 2023-12-10 RX ADMIN — CHLORHEXIDINE GLUCONATE 1 APPLICATION(S): 213 SOLUTION TOPICAL at 05:53

## 2023-12-10 RX ADMIN — Medication 12.5 MILLIGRAM(S): at 17:26

## 2023-12-10 RX ADMIN — Medication 81 MILLIGRAM(S): at 11:38

## 2023-12-10 NOTE — PROGRESS NOTE ADULT - ASSESSMENT
IMPRESSION:    #NSTEMI  #HTN    PLAN:    CNS  - avoid oversedation  - considering age would encourage good sleep/wake cycle; frequent reorientation    HEENT  - oral care    PULM  - HOB 45 degrees  - on room air  - CXR: No radiographic evidence of cardiopulmonary disease    CARDIOVASCULAR  - Troponin 0.82 > 1.22 > 2.41 > 2.40  - c/w ASA/Plavix  - c/w atorvastatin 80 mg  - c/w metoprolol tartrate 12.5 mg BID  - Continue heparin gtt for at least 48 hours  - hold amlodipine for now  - serial EKG  - TTE w/ con  - f/u lipid panel  - LHC/coronary angiogram on Monday    GI  - tolerating feeds, having BMs  -  trend  - hepatic panel  - RUQ US    RENAL  - replete lytes K>4 Mg>2.1 as needed    ID  - observe off abx    HEME   - trend h/h while on AC/AP    ENDO  - a1c  - TSH  - insulin protocol as needed    MSK  - Increase as tolerated    Handoff:  Cath tomorrow 12/11, NPO @midnight   Trend troponin 82 YO F with PMH of HTN presented with chest pain and found to have NSTEMI.     IMPRESSION:  #NSTEMI  #HTN    PLAN:    CNS  - avoid oversedation  - considering age would encourage good sleep/wake cycle; frequent reorientation    HEENT  - oral care    PULM  - HOB 45 degrees  - on room air  - CXR: No radiographic evidence of cardiopulmonary disease    CARDIOVASCULAR  - Troponin trend: 0.82  (08 Dec 2023 13:00), 1.22  (08 Dec 2023 15:31), 2.41  (08 Dec 2023 21:27), 2.40  (09 Dec 2023 04:43), 2.93  (10 Dec 2023 04:38).  - Denies active chest pain  - ASA 81 mg PO QD, Plavix 75 mg PO QD  - Atorvastatin 80 mg  - Metoprolol tartrate 12.5 mg PO BID  - Continue heparin gtt for at least 48 hours  - hold amlodipine for now  - serial EKG  - TTE 12/9/23: EF 64%. GIDD. RWMA (Basal andmid anteroseptal mild hypokinesis, moderate inferobasal hypokinesis)  - Cholesterol 119 mg/dL, Triglyceride 84 mg/dL, LDL 46 mg/dL, HDL 57 mg/dL, [12-10-23]  - LHC on Monday    GI  - tolerating feeds, having BMs    RENAL  - replete lytes K>4 Mg>2.2 as needed    ID  - Monitor off abx    HEME   - trend h/h while on AC/AP    ENDO  - HbA1C 5.5 % [12-08-23]   - TSH 2.27 (12/8/23)    MSK  - Increase as tolerated    Handoff:  Cath tomorrow 12/11, NPO @midnight  84 YO F with PMH of HTN presented with chest pain and found to have NSTEMI.     IMPRESSION:  #NSTEMI  #HTN    PLAN:    CNS  - avoid oversedation  - considering age would encourage good sleep/wake cycle; frequent reorientation    HEENT  - oral care    PULM  - HOB 45 degrees  - on room air  - CXR: No radiographic evidence of cardiopulmonary disease    CARDIOVASCULAR  - Troponin trend: 0.82  (08 Dec 2023 13:00), 1.22  (08 Dec 2023 15:31), 2.41  (08 Dec 2023 21:27), 2.40  (09 Dec 2023 04:43), 2.93  (10 Dec 2023 04:38).  - Denies active chest pain  - ASA 81 mg PO QD, Plavix 75 mg PO QD  - Atorvastatin 80 mg  - Metoprolol tartrate 12.5 mg PO BID  - Continue heparin gtt for at least 48 hours  - hold amlodipine for now  - serial EKG  - TTE 12/9/23: EF 64%. GIDD. RWMA (Basal andmid anteroseptal mild hypokinesis, moderate inferobasal hypokinesis)  - Cholesterol 119 mg/dL, Triglyceride 84 mg/dL, LDL 46 mg/dL, HDL 57 mg/dL, [12-10-23]  - LHC on Monday    GI  - tolerating feeds, having BMs    RENAL  - replete lytes K>4 Mg>2.2 as needed    ID  - Monitor off abx    HEME   - trend h/h while on AC/AP    ENDO  - HbA1C 5.5 % [12-08-23]   - TSH 2.27 (12/8/23)    MSK  - Increase as tolerated    Handoff:  Cath tomorrow 12/11, NPO @midnight  82 YO F with PMH of HTN presented with chest pain and found to have NSTEMI.     IMPRESSION:  #NSTEMI  #HTN    PLAN:    CNS  - avoid sedation  - Good sleep/wake cycle; frequent reorientation    HEENT  - oral care    PULM  - HOB 45 degrees  - on room air    CARDIOVASCULAR  - Troponin trend: 0.82  (08 Dec 2023 13:00), 1.22  (08 Dec 2023 15:31), 2.41  (08 Dec 2023 21:27), 2.40  (09 Dec 2023 04:43), 2.93  (10 Dec 2023 04:38). Continue to trend troponin  - Denies active chest pain  - ASA 81 mg PO QD, Plavix 75 mg PO QD  - Atorvastatin 80 mg  - Metoprolol tartrate 12.5 mg PO BID  - Continue heparin gtt for at least 48 hours  - hold amlodipine for now  - serial EKG  - TTE 12/9/23: EF 64%. GIDD. RWMA (Basal and mid anteroseptal mild hypokinesis, moderate inferobasal hypokinesis)  - Cholesterol 119 mg/dL, Triglyceride 84 mg/dL, LDL 46 mg/dL, HDL 57 mg/dL, [12-10-23]  - LHC on Monday with Dr. Leonora Saha    GI  - Monitor BM. Laxatives if needed    RENAL  - replete lytes K>4 Mg>2.2 as needed    ID  - Monitor off abx    HEME   - trend h/h while on AC/AP    ENDO  - HbA1C 5.5 % [12-08-23]   - TSH 2.27 (12/8/23)    MSK  - Ambulate as tolerated    Handoff:  Cath tomorrow 12/11, NPO @midnight

## 2023-12-10 NOTE — PROGRESS NOTE ADULT - SUBJECTIVE AND OBJECTIVE BOX
CHIEF COMPLAINT:  Patient is a 83y old  Female who presents with a chief complaint of NSTEMI (09 Dec 2023 08:49)      INTERVAL HISTORY/OVERNIGHT EVENTS:  Patient had no acute events overnight. Troponin continuing to rise. Plan for cath tomorrow NPO @midnight.    ======================  MEDICATIONS:  artificial  tears Solution 1 Drop(s) Both EYES every 12 hours  aspirin enteric coated 81 milliGRAM(s) Oral daily  atorvastatin 80 milliGRAM(s) Oral at bedtime  chlorhexidine 2% Cloths 1 Application(s) Topical <User Schedule>  clopidogrel Tablet 75 milliGRAM(s) Oral daily  famotidine    Tablet 20 milliGRAM(s) Oral daily  influenza  Vaccine (HIGH DOSE) 0.7 milliLiter(s) IntraMuscular once  metoprolol tartrate 12.5 milliGRAM(s) Oral every 12 hours    DRIPS:  heparin  Infusion.  Unit(s)/Hr (6.5 mL/Hr) IV Continuous <Continuous>    PRN:       ======================  PHYSICAL EXAMINATION:  GEN:  nad.   HEENT:  eomi. ncat  PULM:  b/l lung sounds   CARD: s1, s2  ABD: +bs. ntnd  EXT:  no new rashes.    NEURO:  no new focal deficits.   ======================  OBJECTIVE:        VS:  T(F): 98.6 (12-10 @ 04:00), Max: 100.1 (12-09 @ 20:00)  HR: 62 (12-10 @ 07:00) (60 - 78)  BP: 113/60 (12-10 @ 07:00) (98/71 - 128/85)  RR: 24 (12-10 @ 07:00) (12 - 53)  SpO2: 95% (12-10 @ 07:00) (92% - 99%)  CVP(mm Hg): --  CO: --  CI: --  PA: --  PCWP: --    I/O:      12-08 @ 07:01  -  12-09 @ 07:00  --------------------------------------------------------  IN: 87.5 mL / OUT: 750 mL / NET: -662.5 mL    12-09 @ 07:01  -  12-10 @ 07:00  --------------------------------------------------------  IN: 848 mL / OUT: 1100 mL / NET: -252 mL    12-10 @ 07:01  -  12-10 @ 07:36  --------------------------------------------------------  IN: 8.5 mL / OUT: 0 mL / NET: 8.5 mL        Weight trend:  Weight (kg): 52.2 (12-08)    ======================    LABS:                          13.5   7.52  )-----------( 176      ( 10 Dec 2023 04:38 )             40.1     12-10    138  |  105  |  13  ----------------------------<  108<H>  3.8   |  24  |  0.9    Ca    9.1      10 Dec 2023 04:38  Mg     2.4     12-10    TPro  6.2  /  Alb  4.0  /  TBili  2.2<H>  /  DBili  x   /  AST  59<H>  /  ALT  26  /  AlkPhos  73  12-10    LIVER FUNCTIONS - ( 10 Dec 2023 04:38 )  Alb: 4.0 g/dL / Pro: 6.2 g/dL / ALK PHOS: 73 U/L / ALT: 26 U/L / AST: 59 U/L / GGT: x           PT/INR - ( 09 Dec 2023 04:04 )   PT: 11.50 sec;   INR: 1.01 ratio         PTT - ( 10 Dec 2023 04:38 )  PTT:52.6 sec  Troponin T, Serum: 2.93 ng/mL (12-10 @ 04:38)    CARDIAC MARKERS ( 10 Dec 2023 04:38 )  x     / 2.93 ng/mL / x     / x     / x      CARDIAC MARKERS ( 09 Dec 2023 04:43 )  x     / 2.40 ng/mL / x     / x     / x      CARDIAC MARKERS ( 08 Dec 2023 21:27 )  x     / 2.41 ng/mL / x     / x     / x      CARDIAC MARKERS ( 08 Dec 2023 15:31 )  x     / 1.22 ng/mL / x     / x     / x      CARDIAC MARKERS ( 08 Dec 2023 13:00 )  x     / 0.82 ng/mL / x     / x     / x            Urinalysis Basic - ( 10 Dec 2023 04:38 )    Color: x / Appearance: x / SG: x / pH: x  Gluc: 108 mg/dL / Ketone: x  / Bili: x / Urobili: x   Blood: x / Protein: x / Nitrite: x   Leuk Esterase: x / RBC: x / WBC x   Sq Epi: x / Non Sq Epi: x / Bacteria: x        Cultures:

## 2023-12-10 NOTE — PROGRESS NOTE ADULT - ASSESSMENT
82 y/o female PMH HTN presenting with chest pain and found to have NSTEMI. She does not have any significant risk factors including negative family history. At this time she is chest pain free but given significant troponin elevation will plan for LHC/coronary angiogram on Monday. If patient develops symptoms then may need to proceed with angiogram sooner. We will continue with ACS treatment in the meantime.    #NSTEMI  #HTN    Plan:  -LHC/coronary angiogram on Monday  -Continue ASA/Plavix  -Continue heparin gtt until angiogram  -Continue metoprolol tartrate 12.5 mg BID  -Continue atorvastatin 80 mg   -Check lipid panel    Chase Saha MD  Interventional Cardiology/Advanced Heart Failure Transplant   84 y/o female PMH HTN presenting with chest pain and found to have NSTEMI. She does not have any significant risk factors including negative family history. At this time she is chest pain free but given significant troponin elevation will plan for LHC/coronary angiogram on Monday. If patient develops symptoms then may need to proceed with angiogram sooner. We will continue with ACS treatment in the meantime.    #NSTEMI  #HTN    Plan:  -LHC/coronary angiogram on Monday  -Continue ASA/Plavix  -Continue heparin gtt until angiogram  -Continue metoprolol tartrate 12.5 mg BID  -Continue atorvastatin 80 mg   -Check lipid panel    Chase Saha MD  Interventional Cardiology/Advanced Heart Failure Transplant   84 y/o female PMH HTN presenting with chest pain and found to have NSTEMI. She does not have any significant risk factors including negative family history. At this time she is chest pain free but given significant troponin elevation will plan for LHC/coronary angiogram on Monday. If patient develops symptoms then may need to proceed with angiogram sooner. We will continue with ACS treatment in the meantime.    #NSTEMI  #HTN    Plan:  -LHC/coronary angiogram on Monday  -Continue ASA/Plavix  -Continue heparin gtt until angiogram  -Continue metoprolol tartrate 12.5 mg BID  -Continue atorvastatin 80 mg   -Check lipid panel  -NPO after midnight    Chase Saha MD  Interventional Cardiology/Advanced Heart Failure Transplant

## 2023-12-10 NOTE — PROGRESS NOTE ADULT - SUBJECTIVE AND OBJECTIVE BOX
Cardiology Attending Note    VIBHA RICHARDS  MRN-[id number]    Date of Admission: 12-08-23    Brief HPI: 84 y/o female PMH HTN presenting with chest pain and found to have NSTEMI.    24 hr events:  NAEON. No complaints    artificial  tears Solution 1 Drop(s) Both EYES every 12 hours  aspirin enteric coated 81 milliGRAM(s) Oral daily  atorvastatin 80 milliGRAM(s) Oral at bedtime  chlorhexidine 2% Cloths 1 Application(s) Topical <User Schedule>  clopidogrel Tablet 75 milliGRAM(s) Oral daily  famotidine    Tablet 20 milliGRAM(s) Oral daily  heparin   Injectable 3200 Unit(s) IV Push every 6 hours PRN  heparin  Infusion.  Unit(s)/Hr IV Continuous <Continuous>  metoprolol tartrate 12.5 milliGRAM(s) Oral every 12 hours      PHYSICAL EXAM:  T(C): 36.4 (12-09-23 @ 08:00), Max: 37.1 (12-09-23 @ 04:00)  T(F): 97.5 (12-09-23 @ 08:00), Max: 98.8 (12-09-23 @ 04:00)  HR: 62 (12-09-23 @ 08:00) (55 - 73)  BP: 128/85 (12-09-23 @ 08:00) (107/60 - 137/65)  RR: 19 (12-09-23 @ 08:00) (15 - 20)  SpO2: 95% (12-09-23 @ 08:00) (93% - 100%)  Height (cm): 154.9 (12-08-23 @ 20:00)  Weight (kg): 52.2 (12-08-23 @ 20:00)  BSA (m2): 1.49 (12-08-23 @ 20:00)    12-08-23 @ 07:01  -  12-09-23 @ 07:00  --------------------------------------------------------  IN: 87.5 mL / OUT: 750 mL / NET: -662.5 mL    12-09-23 @ 07:01  -  12-09-23 @ 08:49  --------------------------------------------------------  IN: 65 mL / OUT: 0 mL / NET: 65 mL      I&O's Detail    08 Dec 2023 07:01  -  09 Dec 2023 07:00  --------------------------------------------------------  IN:    Heparin Infusion: 13 mL    Heparin Infusion: 74.5 mL  Total IN: 87.5 mL    OUT:    Voided (mL): 750 mL  Total OUT: 750 mL    Total NET: -662.5 mL      09 Dec 2023 07:01  -  09 Dec 2023 08:49  --------------------------------------------------------  IN:    Heparin Infusion: 15 mL    Oral Fluid: 50 mL  Total IN: 65 mL    OUT:  Total OUT: 0 mL    Total NET: 65 mL    General Appearance: no acute distress	  Cardiovascular: regular rate and rhythm S1 S2, No JVD, No murmurs  Respiratory: clear bilaterally  Gastrointestinal:  soft, non-tender  Skin/Integumen: no edema  Neurologic: non focal  Musculoskeletal/ extremities: warm  Vascular: Peripheral pulses palpable 2+ bilaterally    LABS:	 	                        13.8   7.22  )-----------( 193      ( 09 Dec 2023 04:43 )             41.7     12-09    142  |  106  |  9<L>  ----------------------------<  100<H>  3.9   |  22  |  0.7    Ca    9.4      09 Dec 2023 04:43  Mg     2.3     12-09    TPro  6.4  /  Alb  4.0  /  TBili  2.9<H>  /  DBili  x   /  AST  122<H>  /  ALT  32  /  AlkPhos  79  12-09    PT/INR - ( 09 Dec 2023 04:04 )   PT: 11.50 sec;   INR: 1.01 ratio         PTT - ( 09 Dec 2023 04:04 )  PTT:39.8 sec    Troponin T, Serum: 2.40 ng/mL (12-09-23 @ 04:43)  Troponin T, Serum: 2.41 ng/mL (12-08-23 @ 21:27)  Troponin T, Serum: 1.22 ng/mL (12-08-23 @ 15:31)  Troponin T, Serum: 0.82 ng/mL (12-08-23 @ 13:00)          CARDIAC TESTING:  ECG: HR 59, sinus, q wave inferiorly with slight CATIE, poor R wave progression  	  Telemetry:  Echocardiogram: pending formal read but normal appearing LV function with possible WMA in basal inferior/inferoseptal wall  Coronary angiogram:  RHC:  Stress test:  cMRI:    OTHER DIAGNOSTIC TESTING:  CXR: no acute pathology  CT:   Cardiology Attending Note    VIBHA RICHARDS  MRN-[id number]    Date of Admission: 12-08-23    Brief HPI: 84 y/o female PMH HTN presenting with chest pain and found to have NSTEMI.    24 hr events:  NAEON. No complaints    artificial  tears Solution 1 Drop(s) Both EYES every 12 hours  aspirin enteric coated 81 milliGRAM(s) Oral daily  atorvastatin 80 milliGRAM(s) Oral at bedtime  chlorhexidine 2% Cloths 1 Application(s) Topical <User Schedule>  clopidogrel Tablet 75 milliGRAM(s) Oral daily  famotidine    Tablet 20 milliGRAM(s) Oral daily  heparin   Injectable 3200 Unit(s) IV Push every 6 hours PRN  heparin  Infusion.  Unit(s)/Hr IV Continuous <Continuous>  metoprolol tartrate 12.5 milliGRAM(s) Oral every 12 hours      PHYSICAL EXAM:  T(C): 36.4 (12-09-23 @ 08:00), Max: 37.1 (12-09-23 @ 04:00)  T(F): 97.5 (12-09-23 @ 08:00), Max: 98.8 (12-09-23 @ 04:00)  HR: 62 (12-09-23 @ 08:00) (55 - 73)  BP: 128/85 (12-09-23 @ 08:00) (107/60 - 137/65)  RR: 19 (12-09-23 @ 08:00) (15 - 20)  SpO2: 95% (12-09-23 @ 08:00) (93% - 100%)  Height (cm): 154.9 (12-08-23 @ 20:00)  Weight (kg): 52.2 (12-08-23 @ 20:00)  BSA (m2): 1.49 (12-08-23 @ 20:00)    12-08-23 @ 07:01  -  12-09-23 @ 07:00  --------------------------------------------------------  IN: 87.5 mL / OUT: 750 mL / NET: -662.5 mL    12-09-23 @ 07:01  -  12-09-23 @ 08:49  --------------------------------------------------------  IN: 65 mL / OUT: 0 mL / NET: 65 mL      I&O's Detail    08 Dec 2023 07:01  -  09 Dec 2023 07:00  --------------------------------------------------------  IN:    Heparin Infusion: 13 mL    Heparin Infusion: 74.5 mL  Total IN: 87.5 mL    OUT:    Voided (mL): 750 mL  Total OUT: 750 mL    Total NET: -662.5 mL      09 Dec 2023 07:01  -  09 Dec 2023 08:49  --------------------------------------------------------  IN:    Heparin Infusion: 15 mL    Oral Fluid: 50 mL  Total IN: 65 mL    OUT:  Total OUT: 0 mL    Total NET: 65 mL    General Appearance: no acute distress	  Cardiovascular: regular rate and rhythm S1 S2, No JVD, No murmurs  Respiratory: clear bilaterally  Gastrointestinal:  soft, non-tender  Skin/Integumen: no edema  Neurologic: non focal  Musculoskeletal/ extremities: warm  Vascular: Peripheral pulses palpable 2+ bilaterally    LABS:	 	                        13.8   7.22  )-----------( 193      ( 09 Dec 2023 04:43 )             41.7     12-09    142  |  106  |  9<L>  ----------------------------<  100<H>  3.9   |  22  |  0.7    Ca    9.4      09 Dec 2023 04:43  Mg     2.3     12-09    TPro  6.4  /  Alb  4.0  /  TBili  2.9<H>  /  DBili  x   /  AST  122<H>  /  ALT  32  /  AlkPhos  79  12-09    PT/INR - ( 09 Dec 2023 04:04 )   PT: 11.50 sec;   INR: 1.01 ratio         PTT - ( 09 Dec 2023 04:04 )  PTT:39.8 sec    Troponin T, Serum: 2.40 ng/mL (12-09-23 @ 04:43)  Troponin T, Serum: 2.41 ng/mL (12-08-23 @ 21:27)  Troponin T, Serum: 1.22 ng/mL (12-08-23 @ 15:31)  Troponin T, Serum: 0.82 ng/mL (12-08-23 @ 13:00)          CARDIAC TESTING:  ECG: HR 59, sinus, q wave inferiorly with slight CATIE, poor R wave progression  	  Telemetry:  Echocardiogram: pending formal read but normal appearing LV function with possible WMA in basal inferior/inferoseptal wall  Coronary angiogram:  RHC:  Stress test:  cMRI:    OTHER DIAGNOSTIC TESTING:  CXR: no acute pathology  CT:   Cardiology Attending Note    VIBHA RICHARDS  MRN-[id number]    Date of Admission: 12-08-23    Brief HPI: 82 y/o female PMH HTN presenting with chest pain and found to have NSTEMI.    24 hr events:  NAEON. No complaints    artificial  tears Solution 1 Drop(s) Both EYES every 12 hours  aspirin enteric coated 81 milliGRAM(s) Oral daily  atorvastatin 80 milliGRAM(s) Oral at bedtime  chlorhexidine 2% Cloths 1 Application(s) Topical <User Schedule>  clopidogrel Tablet 75 milliGRAM(s) Oral daily  famotidine    Tablet 20 milliGRAM(s) Oral daily  heparin   Injectable 3200 Unit(s) IV Push every 6 hours PRN  heparin  Infusion.  Unit(s)/Hr IV Continuous <Continuous>  metoprolol tartrate 12.5 milliGRAM(s) Oral every 12 hours      PHYSICAL EXAM:  T(C): 36.4 (12-09-23 @ 08:00), Max: 37.1 (12-09-23 @ 04:00)  T(F): 97.5 (12-09-23 @ 08:00), Max: 98.8 (12-09-23 @ 04:00)  HR: 62 (12-09-23 @ 08:00) (55 - 73)  BP: 128/85 (12-09-23 @ 08:00) (107/60 - 137/65)  RR: 19 (12-09-23 @ 08:00) (15 - 20)  SpO2: 95% (12-09-23 @ 08:00) (93% - 100%)  Height (cm): 154.9 (12-08-23 @ 20:00)  Weight (kg): 52.2 (12-08-23 @ 20:00)  BSA (m2): 1.49 (12-08-23 @ 20:00)    12-08-23 @ 07:01  -  12-09-23 @ 07:00  --------------------------------------------------------  IN: 87.5 mL / OUT: 750 mL / NET: -662.5 mL    12-09-23 @ 07:01  -  12-09-23 @ 08:49  --------------------------------------------------------  IN: 65 mL / OUT: 0 mL / NET: 65 mL      I&O's Detail    08 Dec 2023 07:01  -  09 Dec 2023 07:00  --------------------------------------------------------  IN:    Heparin Infusion: 13 mL    Heparin Infusion: 74.5 mL  Total IN: 87.5 mL    OUT:    Voided (mL): 750 mL  Total OUT: 750 mL    Total NET: -662.5 mL      09 Dec 2023 07:01  -  09 Dec 2023 08:49  --------------------------------------------------------  IN:    Heparin Infusion: 15 mL    Oral Fluid: 50 mL  Total IN: 65 mL    OUT:  Total OUT: 0 mL    Total NET: 65 mL    General Appearance: no acute distress	  Cardiovascular: regular rate and rhythm S1 S2, No JVD, No murmurs  Respiratory: clear bilaterally  Gastrointestinal:  soft, non-tender  Skin/Integumen: no edema  Neurologic: non focal  Musculoskeletal/ extremities: warm  Vascular: Peripheral pulses palpable 2+ bilaterally    LABS:	 	                        13.8   7.22  )-----------( 193      ( 09 Dec 2023 04:43 )             41.7     12-09    142  |  106  |  9<L>  ----------------------------<  100<H>  3.9   |  22  |  0.7    Ca    9.4      09 Dec 2023 04:43  Mg     2.3     12-09    TPro  6.4  /  Alb  4.0  /  TBili  2.9<H>  /  DBili  x   /  AST  122<H>  /  ALT  32  /  AlkPhos  79  12-09    PT/INR - ( 09 Dec 2023 04:04 )   PT: 11.50 sec;   INR: 1.01 ratio         PTT - ( 09 Dec 2023 04:04 )  PTT:39.8 sec    Troponin T, Serum: 2.40 ng/mL (12-09-23 @ 04:43)  Troponin T, Serum: 2.41 ng/mL (12-08-23 @ 21:27)  Troponin T, Serum: 1.22 ng/mL (12-08-23 @ 15:31)  Troponin T, Serum: 0.82 ng/mL (12-08-23 @ 13:00)          CARDIAC TESTING:  ECG: HR 59, sinus, q wave inferiorly with slight CATIE, poor R wave progression  	  Telemetry: 4 beats NSVT  Echocardiogram: pending formal read but normal appearing LV function with possible WMA in basal inferior/inferoseptal wall  Coronary angiogram:  RHC:  Stress test:  cMRI:    OTHER DIAGNOSTIC TESTING:  CXR: no acute pathology  CT:   Cardiology Attending Note    VIBHA RICHARDS  MRN-[id number]    Date of Admission: 12-08-23    Brief HPI: 82 y/o female PMH HTN presenting with chest pain and found to have NSTEMI.    24 hr events:  NAEON. No complaints    artificial  tears Solution 1 Drop(s) Both EYES every 12 hours  aspirin enteric coated 81 milliGRAM(s) Oral daily  atorvastatin 80 milliGRAM(s) Oral at bedtime  chlorhexidine 2% Cloths 1 Application(s) Topical <User Schedule>  clopidogrel Tablet 75 milliGRAM(s) Oral daily  famotidine    Tablet 20 milliGRAM(s) Oral daily  heparin   Injectable 3200 Unit(s) IV Push every 6 hours PRN  heparin  Infusion.  Unit(s)/Hr IV Continuous <Continuous>  metoprolol tartrate 12.5 milliGRAM(s) Oral every 12 hours      PHYSICAL EXAM:  T(C): 36.4 (12-09-23 @ 08:00), Max: 37.1 (12-09-23 @ 04:00)  T(F): 97.5 (12-09-23 @ 08:00), Max: 98.8 (12-09-23 @ 04:00)  HR: 62 (12-09-23 @ 08:00) (55 - 73)  BP: 128/85 (12-09-23 @ 08:00) (107/60 - 137/65)  RR: 19 (12-09-23 @ 08:00) (15 - 20)  SpO2: 95% (12-09-23 @ 08:00) (93% - 100%)  Height (cm): 154.9 (12-08-23 @ 20:00)  Weight (kg): 52.2 (12-08-23 @ 20:00)  BSA (m2): 1.49 (12-08-23 @ 20:00)    12-08-23 @ 07:01  -  12-09-23 @ 07:00  --------------------------------------------------------  IN: 87.5 mL / OUT: 750 mL / NET: -662.5 mL    12-09-23 @ 07:01  -  12-09-23 @ 08:49  --------------------------------------------------------  IN: 65 mL / OUT: 0 mL / NET: 65 mL      I&O's Detail    08 Dec 2023 07:01  -  09 Dec 2023 07:00  --------------------------------------------------------  IN:    Heparin Infusion: 13 mL    Heparin Infusion: 74.5 mL  Total IN: 87.5 mL    OUT:    Voided (mL): 750 mL  Total OUT: 750 mL    Total NET: -662.5 mL      09 Dec 2023 07:01  -  09 Dec 2023 08:49  --------------------------------------------------------  IN:    Heparin Infusion: 15 mL    Oral Fluid: 50 mL  Total IN: 65 mL    OUT:  Total OUT: 0 mL    Total NET: 65 mL    General Appearance: no acute distress	  Cardiovascular: regular rate and rhythm S1 S2, No JVD, No murmurs  Respiratory: clear bilaterally  Gastrointestinal:  soft, non-tender  Skin/Integumen: no edema  Neurologic: non focal  Musculoskeletal/ extremities: warm  Vascular: Peripheral pulses palpable 2+ bilaterally    LABS:	 	                        13.8   7.22  )-----------( 193      ( 09 Dec 2023 04:43 )             41.7     12-09    142  |  106  |  9<L>  ----------------------------<  100<H>  3.9   |  22  |  0.7    Ca    9.4      09 Dec 2023 04:43  Mg     2.3     12-09    TPro  6.4  /  Alb  4.0  /  TBili  2.9<H>  /  DBili  x   /  AST  122<H>  /  ALT  32  /  AlkPhos  79  12-09    PT/INR - ( 09 Dec 2023 04:04 )   PT: 11.50 sec;   INR: 1.01 ratio         PTT - ( 09 Dec 2023 04:04 )  PTT:39.8 sec    Troponin T, Serum: 2.40 ng/mL (12-09-23 @ 04:43)  Troponin T, Serum: 2.41 ng/mL (12-08-23 @ 21:27)  Troponin T, Serum: 1.22 ng/mL (12-08-23 @ 15:31)  Troponin T, Serum: 0.82 ng/mL (12-08-23 @ 13:00)          CARDIAC TESTING:  ECG: HR 59, sinus, q wave inferiorly with slight CATIE, poor R wave progression  	  Telemetry: 4 beats NSVT  Echocardiogram: pending formal read but normal appearing LV function with possible WMA in basal inferior/inferoseptal wall  Coronary angiogram:  RHC:  Stress test:  cMRI:    OTHER DIAGNOSTIC TESTING:  CXR: no acute pathology  CT:

## 2023-12-11 LAB
ALBUMIN SERPL ELPH-MCNC: 3.8 G/DL — SIGNIFICANT CHANGE UP (ref 3.5–5.2)
ALBUMIN SERPL ELPH-MCNC: 3.8 G/DL — SIGNIFICANT CHANGE UP (ref 3.5–5.2)
ALP SERPL-CCNC: 77 U/L — SIGNIFICANT CHANGE UP (ref 30–115)
ALP SERPL-CCNC: 77 U/L — SIGNIFICANT CHANGE UP (ref 30–115)
ALT FLD-CCNC: 37 U/L — SIGNIFICANT CHANGE UP (ref 0–41)
ALT FLD-CCNC: 37 U/L — SIGNIFICANT CHANGE UP (ref 0–41)
ANION GAP SERPL CALC-SCNC: 8 MMOL/L — SIGNIFICANT CHANGE UP (ref 7–14)
ANION GAP SERPL CALC-SCNC: 8 MMOL/L — SIGNIFICANT CHANGE UP (ref 7–14)
APTT BLD: 62.4 SEC — HIGH (ref 27–39.2)
APTT BLD: 62.4 SEC — HIGH (ref 27–39.2)
AST SERPL-CCNC: 56 U/L — HIGH (ref 0–41)
AST SERPL-CCNC: 56 U/L — HIGH (ref 0–41)
BASOPHILS # BLD AUTO: 0.04 K/UL — SIGNIFICANT CHANGE UP (ref 0–0.2)
BASOPHILS # BLD AUTO: 0.04 K/UL — SIGNIFICANT CHANGE UP (ref 0–0.2)
BASOPHILS NFR BLD AUTO: 0.7 % — SIGNIFICANT CHANGE UP (ref 0–1)
BASOPHILS NFR BLD AUTO: 0.7 % — SIGNIFICANT CHANGE UP (ref 0–1)
BILIRUB SERPL-MCNC: 1.4 MG/DL — HIGH (ref 0.2–1.2)
BILIRUB SERPL-MCNC: 1.4 MG/DL — HIGH (ref 0.2–1.2)
BUN SERPL-MCNC: 16 MG/DL — SIGNIFICANT CHANGE UP (ref 10–20)
BUN SERPL-MCNC: 16 MG/DL — SIGNIFICANT CHANGE UP (ref 10–20)
CALCIUM SERPL-MCNC: 8.9 MG/DL — SIGNIFICANT CHANGE UP (ref 8.4–10.4)
CALCIUM SERPL-MCNC: 8.9 MG/DL — SIGNIFICANT CHANGE UP (ref 8.4–10.4)
CHLORIDE SERPL-SCNC: 106 MMOL/L — SIGNIFICANT CHANGE UP (ref 98–110)
CHLORIDE SERPL-SCNC: 106 MMOL/L — SIGNIFICANT CHANGE UP (ref 98–110)
CO2 SERPL-SCNC: 25 MMOL/L — SIGNIFICANT CHANGE UP (ref 17–32)
CO2 SERPL-SCNC: 25 MMOL/L — SIGNIFICANT CHANGE UP (ref 17–32)
CREAT SERPL-MCNC: 0.8 MG/DL — SIGNIFICANT CHANGE UP (ref 0.7–1.5)
CREAT SERPL-MCNC: 0.8 MG/DL — SIGNIFICANT CHANGE UP (ref 0.7–1.5)
EGFR: 74 ML/MIN/1.73M2 — SIGNIFICANT CHANGE UP
EGFR: 74 ML/MIN/1.73M2 — SIGNIFICANT CHANGE UP
EOSINOPHIL # BLD AUTO: 0.09 K/UL — SIGNIFICANT CHANGE UP (ref 0–0.7)
EOSINOPHIL # BLD AUTO: 0.09 K/UL — SIGNIFICANT CHANGE UP (ref 0–0.7)
EOSINOPHIL NFR BLD AUTO: 1.5 % — SIGNIFICANT CHANGE UP (ref 0–8)
EOSINOPHIL NFR BLD AUTO: 1.5 % — SIGNIFICANT CHANGE UP (ref 0–8)
GLUCOSE SERPL-MCNC: 118 MG/DL — HIGH (ref 70–99)
GLUCOSE SERPL-MCNC: 118 MG/DL — HIGH (ref 70–99)
HCT VFR BLD CALC: 38.8 % — SIGNIFICANT CHANGE UP (ref 37–47)
HCT VFR BLD CALC: 38.8 % — SIGNIFICANT CHANGE UP (ref 37–47)
HGB BLD-MCNC: 12.9 G/DL — SIGNIFICANT CHANGE UP (ref 12–16)
HGB BLD-MCNC: 12.9 G/DL — SIGNIFICANT CHANGE UP (ref 12–16)
IMM GRANULOCYTES NFR BLD AUTO: 0.5 % — HIGH (ref 0.1–0.3)
IMM GRANULOCYTES NFR BLD AUTO: 0.5 % — HIGH (ref 0.1–0.3)
LYMPHOCYTES # BLD AUTO: 1.22 K/UL — SIGNIFICANT CHANGE UP (ref 1.2–3.4)
LYMPHOCYTES # BLD AUTO: 1.22 K/UL — SIGNIFICANT CHANGE UP (ref 1.2–3.4)
LYMPHOCYTES # BLD AUTO: 20.1 % — LOW (ref 20.5–51.1)
LYMPHOCYTES # BLD AUTO: 20.1 % — LOW (ref 20.5–51.1)
MAGNESIUM SERPL-MCNC: 2.3 MG/DL — SIGNIFICANT CHANGE UP (ref 1.8–2.4)
MAGNESIUM SERPL-MCNC: 2.3 MG/DL — SIGNIFICANT CHANGE UP (ref 1.8–2.4)
MCHC RBC-ENTMCNC: 27.9 PG — SIGNIFICANT CHANGE UP (ref 27–31)
MCHC RBC-ENTMCNC: 27.9 PG — SIGNIFICANT CHANGE UP (ref 27–31)
MCHC RBC-ENTMCNC: 33.2 G/DL — SIGNIFICANT CHANGE UP (ref 32–37)
MCHC RBC-ENTMCNC: 33.2 G/DL — SIGNIFICANT CHANGE UP (ref 32–37)
MCV RBC AUTO: 84 FL — SIGNIFICANT CHANGE UP (ref 81–99)
MCV RBC AUTO: 84 FL — SIGNIFICANT CHANGE UP (ref 81–99)
MONOCYTES # BLD AUTO: 0.57 K/UL — SIGNIFICANT CHANGE UP (ref 0.1–0.6)
MONOCYTES # BLD AUTO: 0.57 K/UL — SIGNIFICANT CHANGE UP (ref 0.1–0.6)
MONOCYTES NFR BLD AUTO: 9.4 % — HIGH (ref 1.7–9.3)
MONOCYTES NFR BLD AUTO: 9.4 % — HIGH (ref 1.7–9.3)
NEUTROPHILS # BLD AUTO: 4.11 K/UL — SIGNIFICANT CHANGE UP (ref 1.4–6.5)
NEUTROPHILS # BLD AUTO: 4.11 K/UL — SIGNIFICANT CHANGE UP (ref 1.4–6.5)
NEUTROPHILS NFR BLD AUTO: 67.8 % — SIGNIFICANT CHANGE UP (ref 42.2–75.2)
NEUTROPHILS NFR BLD AUTO: 67.8 % — SIGNIFICANT CHANGE UP (ref 42.2–75.2)
NRBC # BLD: 0 /100 WBCS — SIGNIFICANT CHANGE UP (ref 0–0)
NRBC # BLD: 0 /100 WBCS — SIGNIFICANT CHANGE UP (ref 0–0)
PHOSPHATE SERPL-MCNC: 3.7 MG/DL — SIGNIFICANT CHANGE UP (ref 2.1–4.9)
PHOSPHATE SERPL-MCNC: 3.7 MG/DL — SIGNIFICANT CHANGE UP (ref 2.1–4.9)
PLATELET # BLD AUTO: 151 K/UL — SIGNIFICANT CHANGE UP (ref 130–400)
PLATELET # BLD AUTO: 151 K/UL — SIGNIFICANT CHANGE UP (ref 130–400)
PMV BLD: 12.4 FL — HIGH (ref 7.4–10.4)
PMV BLD: 12.4 FL — HIGH (ref 7.4–10.4)
POTASSIUM SERPL-MCNC: 3.9 MMOL/L — SIGNIFICANT CHANGE UP (ref 3.5–5)
POTASSIUM SERPL-MCNC: 3.9 MMOL/L — SIGNIFICANT CHANGE UP (ref 3.5–5)
POTASSIUM SERPL-SCNC: 3.9 MMOL/L — SIGNIFICANT CHANGE UP (ref 3.5–5)
POTASSIUM SERPL-SCNC: 3.9 MMOL/L — SIGNIFICANT CHANGE UP (ref 3.5–5)
PROT SERPL-MCNC: 6 G/DL — SIGNIFICANT CHANGE UP (ref 6–8)
PROT SERPL-MCNC: 6 G/DL — SIGNIFICANT CHANGE UP (ref 6–8)
RBC # BLD: 4.62 M/UL — SIGNIFICANT CHANGE UP (ref 4.2–5.4)
RBC # BLD: 4.62 M/UL — SIGNIFICANT CHANGE UP (ref 4.2–5.4)
RBC # FLD: 13.3 % — SIGNIFICANT CHANGE UP (ref 11.5–14.5)
RBC # FLD: 13.3 % — SIGNIFICANT CHANGE UP (ref 11.5–14.5)
SODIUM SERPL-SCNC: 139 MMOL/L — SIGNIFICANT CHANGE UP (ref 135–146)
SODIUM SERPL-SCNC: 139 MMOL/L — SIGNIFICANT CHANGE UP (ref 135–146)
TROPONIN T SERPL-MCNC: 2.32 NG/ML — CRITICAL HIGH
TROPONIN T SERPL-MCNC: 2.32 NG/ML — CRITICAL HIGH
WBC # BLD: 6.06 K/UL — SIGNIFICANT CHANGE UP (ref 4.8–10.8)
WBC # BLD: 6.06 K/UL — SIGNIFICANT CHANGE UP (ref 4.8–10.8)
WBC # FLD AUTO: 6.06 K/UL — SIGNIFICANT CHANGE UP (ref 4.8–10.8)
WBC # FLD AUTO: 6.06 K/UL — SIGNIFICANT CHANGE UP (ref 4.8–10.8)

## 2023-12-11 PROCEDURE — 71045 X-RAY EXAM CHEST 1 VIEW: CPT | Mod: 26

## 2023-12-11 PROCEDURE — 99291 CRITICAL CARE FIRST HOUR: CPT | Mod: 25

## 2023-12-11 RX ORDER — SODIUM CHLORIDE 9 MG/ML
1000 INJECTION INTRAMUSCULAR; INTRAVENOUS; SUBCUTANEOUS
Refills: 0 | Status: DISCONTINUED | OUTPATIENT
Start: 2023-12-11 | End: 2023-12-12

## 2023-12-11 RX ADMIN — FAMOTIDINE 20 MILLIGRAM(S): 10 INJECTION INTRAVENOUS at 14:53

## 2023-12-11 RX ADMIN — CLOPIDOGREL BISULFATE 75 MILLIGRAM(S): 75 TABLET, FILM COATED ORAL at 11:12

## 2023-12-11 RX ADMIN — Medication 81 MILLIGRAM(S): at 11:12

## 2023-12-11 RX ADMIN — Medication 12.5 MILLIGRAM(S): at 06:11

## 2023-12-11 RX ADMIN — CHLORHEXIDINE GLUCONATE 1 APPLICATION(S): 213 SOLUTION TOPICAL at 06:11

## 2023-12-11 RX ADMIN — SODIUM CHLORIDE 100 MILLILITER(S): 9 INJECTION INTRAMUSCULAR; INTRAVENOUS; SUBCUTANEOUS at 14:55

## 2023-12-11 RX ADMIN — ATORVASTATIN CALCIUM 80 MILLIGRAM(S): 80 TABLET, FILM COATED ORAL at 21:04

## 2023-12-11 NOTE — CHART NOTE - NSCHARTNOTEFT_GEN_A_CORE
PRE-OP DIAGNOSIS:      NSTEMI  PROCEDURE:     [] Coronary Angiogram     [x] LHC     [] LVG     [] RHC     [] Intervention (see below)         PHYSICIAN:  Dr. Saha    ASSISTANT:  Dr. Rodriguez       PROCEDURE DESCRIPTION:     Consent:      [x] Patient     [] Family Member     []  Used        Anesthesia:     [] General     [x] Sedation     [x] Local        Access & Closure:     [x]6 Fr Radial Artery , TR band     [] Fr Femoral Artery     [] Fr Femoral Vein     [] Fr Brachial Vein       IV Contrast: 100mL        Intervention:   PCI of mid-RCA    Implants:    WING 3.5 x 22    FINDINGS:     Coronary Dominance: Right       LM: Minor luminal irregularities.     LAD:   P-LAD Mild disease.   Mid-LAD moderate disease   Distal LAD Mild disease.   D1 Mild disease.   CX:   P-LCX Mild disease.   Distal LCX: Mild disease.   OM1 Mild disease.   RCA:   P-RCA Mild disease.   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    250 cc IV fluid daily 3 days post cath   Encourage oral hydration.

## 2023-12-11 NOTE — CHART NOTE - NSCHARTNOTEFT_GEN_A_CORE
s/p PCI SHANDA x 1 mRCA  no BB, patient bradycardic  if BP tolerates, may start ACEi  Patient given 30 day supply of ( Aspirin 81 mg daily and Plavix 75 mg daily ) to take at home

## 2023-12-11 NOTE — PROGRESS NOTE ADULT - SUBJECTIVE AND OBJECTIVE BOX
Patient is a 83y old  Female who presents with a chief complaint of NSTEMI (10 Dec 2023 08:56)    HPI:  82yo woman pmhx HTN presents from home accompanied by her two sons for CP.    Per pt she was in her usual state of health last night, she developed bandlike pressure at the epigastric level. Not associated w/ exertion, non radiating. Pt assumed it was gas and took OTC gas-ex w/ no improvement. Pt attempted to sleep but pain kept her awake through the night, pt endorses that as time passed her extremities felt cool and she had a slight tremmor. In the morning the pain had improved somewhat but she still decided to present to quick care. From quick care she was sent to ER.     Pt denies any SOB, CENTENO, diaphoresis, weakness, fevers, n/v/d, urinary sxs, dizziness. Of note pt lives on a second story condo and climbs 2 flights of stairs a day w/ no chest pain or SOB. Never smoker, no DM, active; parents lived to 82 years of age, unknown causes of death. Pt has no hx of heart dz in any first degree relatives.     ED   /72 HR 72 RR 17 100% T 97.9F  K 3.3 T Bili 1.9 AST 85 T 0.82  EKG     Pt evaluated by cardio, loaded asa/plavix, hep gtt started, K repleted; will be admitted to CCU for close monitoring and r/o ACS     (08 Dec 2023 15:14)       INTERVAL HPI/OVERNIGHT EVENTS:   No overnight events   Afebrile, hemodynamically stable     Subjective:    ICU Vital Signs Last 24 Hrs  T(C): 36.9 (11 Dec 2023 06:01), Max: 37.3 (10 Dec 2023 20:00)  T(F): 98.4 (11 Dec 2023 06:01), Max: 99.2 (10 Dec 2023 20:00)  HR: 58 (11 Dec 2023 06:01) (57 - 86)  BP: 104/57 (11 Dec 2023 06:01) (100/58 - 137/61)  BP(mean): 75 (11 Dec 2023 06:00) (72 - 98)  ABP: --  ABP(mean): --  RR: 20 (11 Dec 2023 06:01) (12 - 38)  SpO2: 98% (11 Dec 2023 06:01) (94% - 100%)    O2 Parameters below as of 11 Dec 2023 06:01  Patient On (Oxygen Delivery Method): room air          I&O's Summary    10 Dec 2023 07:01  -  11 Dec 2023 07:00  --------------------------------------------------------  IN: 735.5 mL / OUT: 1100 mL / NET: -364.5 mL          Daily     Daily     Adult Advanced Hemodynamics Last 24 Hrs  CVP(mm Hg): --  CVP(cm H2O): --  CO: --  CI: --  PA: --  PA(mean): --  PCWP: --  SVR: --  SVRI: --  PVR: --  PVRI: --    EKG/Telemetry Events:    MEDICATIONS  (STANDING):  artificial  tears Solution 1 Drop(s) Both EYES every 12 hours  aspirin enteric coated 81 milliGRAM(s) Oral daily  atorvastatin 80 milliGRAM(s) Oral at bedtime  chlorhexidine 2% Cloths 1 Application(s) Topical <User Schedule>  clopidogrel Tablet 75 milliGRAM(s) Oral daily  famotidine    Tablet 20 milliGRAM(s) Oral daily  heparin  Infusion.  Unit(s)/Hr (6.5 mL/Hr) IV Continuous <Continuous>  influenza  Vaccine (HIGH DOSE) 0.7 milliLiter(s) IntraMuscular once  metoprolol tartrate 12.5 milliGRAM(s) Oral every 12 hours    MEDICATIONS  (PRN):  heparin   Injectable 3200 Unit(s) IV Push every 6 hours PRN For aPTT less than 40      PHYSICAL EXAM:  GENERAL:   HEAD:  Atraumatic, Normocephalic  EYES: EOMI, PERRLA, conjunctiva and sclera clear  NECK: Supple, No JVD, Normal thyroid, no enlarged nodes  NERVOUS SYSTEM:  Alert & Awake.   CHEST/LUNG: B/L good air entry; No rales, rhonchi, or wheezing  HEART: S1S2 normal, no S3, Regular rate and rhythm; No murmurs  ABDOMEN: Soft, Nontender, Nondistended; Bowel sounds present  EXTREMITIES:  2+ Peripheral Pulses, No clubbing, cyanosis, or edema  LYMPH: No lymphadenopathy noted  SKIN: No rashes or lesions    LABS:                        12.9   6.06  )-----------( 151      ( 11 Dec 2023 04:24 )             38.8     12-11    139  |  106  |  16  ----------------------------<  118<H>  3.9   |  25  |  0.8    Ca    8.9      11 Dec 2023 04:24  Phos  3.7     12-11  Mg     2.3     12-11    TPro  6.0  /  Alb  3.8  /  TBili  1.4<H>  /  DBili  x   /  AST  56<H>  /  ALT  37  /  AlkPhos  77  12-11    LIVER FUNCTIONS - ( 11 Dec 2023 04:24 )  Alb: 3.8 g/dL / Pro: 6.0 g/dL / ALK PHOS: 77 U/L / ALT: 37 U/L / AST: 56 U/L / GGT: x           PTT - ( 11 Dec 2023 04:24 )  PTT:62.4 sec  CAPILLARY BLOOD GLUCOSE          Troponin T, Serum: 2.32 ng/mL (12-11 @ 04:24)  Troponin T, Serum: 2.47 ng/mL (12-10 @ 20:51)  Troponin T, Serum: 2.61 ng/mL (12-10 @ 11:25)    CARDIAC MARKERS ( 11 Dec 2023 04:24 )  x     / 2.32 ng/mL / x     / x     / x      CARDIAC MARKERS ( 10 Dec 2023 20:51 )  x     / 2.47 ng/mL / x     / x     / x      CARDIAC MARKERS ( 10 Dec 2023 11:25 )  x     / 2.61 ng/mL / x     / x     / x      CARDIAC MARKERS ( 10 Dec 2023 04:38 )  x     / 2.93 ng/mL / x     / x     / x          Urinalysis Basic - ( 11 Dec 2023 04:24 )    Color: x / Appearance: x / SG: x / pH: x  Gluc: 118 mg/dL / Ketone: x  / Bili: x / Urobili: x   Blood: x / Protein: x / Nitrite: x   Leuk Esterase: x / RBC: x / WBC x   Sq Epi: x / Non Sq Epi: x / Bacteria: x          RADIOLOGY & ADDITIONAL TESTS:  CXR:  Within normal    Summary:   1. LV Ejection Fraction by Harrison's Method with a biplane EF of 64 %.   2. Spectral Doppler shows impaired relaxation pattern of left   ventricular myocardial filling (Grade I diastolic dysfunction).   3. Basal andmid anteroseptal mild hypokinesis, moderate inferobasal   hypokinesis.   4. Mildly reduced RV systolic function.   5. No evidence of mitral valve regurgitation.   6. Sclerotic aortic valve with normal opening.        Care Discussed with Consultants/Other Providers [ x] YES  [ ] NO

## 2023-12-11 NOTE — PROGRESS NOTE ADULT - ASSESSMENT
High Risk NSTEACS Heidi 89 currently off chest pain for cath today  rising trops, Inferior hypokinesia    84 y/o female PMH HTN presenting with chest pain and found to have NSTEMI. She does not have any significant risk factors including negative family history. At this time she is chest pain free but given significant troponin elevation will plan for LHC/coronary angiogram on Monday. If patient develops symptoms then may need to proceed with angiogram sooner. We will continue with ACS treatment in the meantime.    #NSTEMI  #HTN    Q waved lead III aVF with TWI fixed    Troponin T, Serum: 2.32: Critical value: ng/mL (12.11.23 @ 04:24)   Troponin T, Serum: 2.47: Critical value: ng/mL (12.10.23 @ 20:51)   Troponin T, Serum: 2.61: Critical value: ng/mL (12.10.23 @ 11:25)   Troponin T, Serum: 2.93: Critical value: ng/mL (12.10.23 @ 04:38)   Troponin T, Serum: 2.40: Critical value: ng/mL (12.09.23 @ 04:43)   Troponin T, Serum: 2.41: Critical value: ng/mL (12.08.23 @ 21:27)   Troponin T, Serum: 1.22: Critical value: ng/mL (12.08.23 @ 15:31)      1. LV Ejection Fraction by Harrison's Method with a biplane EF of 64 %.   3. Basal andmid anteroseptal mild hypokinesis, moderate inferobasal   hypokinesis.   4. Mildly reduced RV systolic function.        Plan:  -LHC/coronary angiogram on Monday  -Continue ASA/Plavix  -Continue heparin gtt until angiogram  -Continue metoprolol tartrate 12.5 mg BID  -Continue atorvastatin 80 mg   -Check lipid panel  -NPO after midnight    - hold amlodipine for now      - HbA1C 5.5 % [12-08-23]   - TSH 2.27 (12/8/23)       High Risk NSTEACS Heidi 89 mild RV impairment, normal EF currently off chest pain for cath today with Dr Susan loomis, Inferior hypokinesia    82 y/o female PMH HTN presenting with chest pain and found to have NSTEMI. She does not have any significant risk factors including negative family history. At this time she is chest pain free but given significant troponin elevation will plan for LHC/coronary angiogram on Monday. If patient develops symptoms then may need to proceed with angiogram sooner. We will continue with ACS treatment in the meantime.    #NSTEMI  #HTN    Q waved lead III aVF with TWI fixed    Troponin T, Serum: 2.32: Critical value: ng/mL (12.11.23 @ 04:24)   Troponin T, Serum: 2.47: Critical value: ng/mL (12.10.23 @ 20:51)   Troponin T, Serum: 2.61: Critical value: ng/mL (12.10.23 @ 11:25)   Troponin T, Serum: 2.93: Critical value: ng/mL (12.10.23 @ 04:38)   Troponin T, Serum: 2.40: Critical value: ng/mL (12.09.23 @ 04:43)   Troponin T, Serum: 2.41: Critical value: ng/mL (12.08.23 @ 21:27)   Troponin T, Serum: 1.22: Critical value: ng/mL (12.08.23 @ 15:31)      1. LV Ejection Fraction by Harrison's Method with a biplane EF of 64 %.   3. Basal andmid anteroseptal mild hypokinesis, moderate inferobasal   hypokinesis.   4. Mildly reduced RV systolic function.      Plan:  -LHC/coronary angiogram on Monday  -Continue ASA/Plavix  -Continue heparin gtt until angiogram  -hold metoprolol tartrate 12.5 mg BID, Held off Metoprolol due to recurrent anirudh sinus fear of exacerbating potential RCA intervention induced bradyarythmias  -Continue atorvastatin 80 mg   -lipid panel: LDL Cholesterol Calculated: 46 mg/dL (12.10.23 @ 04:38)     - hold amlodipine for now      - HbA1C 5.5 % [12-08-23]   - TSH 2.27 (12/8/23)  - LDL 46, on atorvastatin 80mgs may consider reducing       High Risk NSTEACS Heidi 89 mild RV impairment, normal EF currently off chest pain for cath today with Dr Susan armstrong trops, Inferior hypokinesia    84 y/o female PMH HTN presenting with chest pain and found to have NSTEMI.     #NSTEMI  #HTN    Q waved lead III aVF with TWI fixed  Troponin continued to uptrend  EF 64%, with Basal and mid anteroseptal mild hypokinesis, moderate inferobasal   hypokinesis; Mildly reduced RV systolic function.      Plan:  -LHC/coronary angiogram today  -Continue ASA/Plavix  -Continue heparin gtt until angiogram  -hold metoprolol tartrate 12.5 mg BID, Held off Metoprolol due to recurrent anirudh sinus fear of exacerbating potential RCA intervention induced bradyarythmias  -Continue atorvastatin 80 mg   -lipid panel: LDL Cholesterol Calculated: 46 mg/dL (12.10.23 @ 04:38)     - hold amlodipine for now      - HbA1C 5.5 % [12-08-23]   - TSH 2.27 (12/8/23)  - LDL 46, on atorvastatin 80mgs may consider reducing

## 2023-12-11 NOTE — CHART NOTE - NSCHARTNOTEFT_GEN_A_CORE
PREOPERATIVE DAY OF PROCEDURE EVALUATION:  I have personally seen and examined the patient.  I agree with the history and physical which I have reviewed and noted any changes below.  (Signed electronically by __________)  12-11-23 @ 11:17      Cath Bleeding Risk: 2.4%    Prehydration:  ml bolus x 1 hr prior to cardiac cath    84yo woman pmhx HTN presented  from home accompanied by her two sons for CP.    Per pt she was in her usual state of health last night, she developed bandlike pressure at the epigastric level. Not associated w/ exertion, non radiating. Pt assumed it was gas and took OTC gas-ex w/ no improvement. Pt attempted to sleep but pain kept her awake through the night, pt endorses that as time passed her extremities felt cool and she had a slight tremor. In the morning the pain had improved somewhat but she still decided to present to quick care. From quick care she was sent to ER.     Trop significant 2.61--> 2.47 -->2.32    Right damian test: Positive    [id number] po /Plavix 75mg po given precath PREOPERATIVE DAY OF PROCEDURE EVALUATION:  I have personally seen and examined the patient.  I agree with the history and physical which I have reviewed and noted any changes below.  (Signed electronically by __________)  12-11-23 @ 11:17      Cath Bleeding Risk: 2.4%    Prehydration:  ml bolus x 1 hr prior to cardiac cath    82yo woman pmhx HTN presented  from home accompanied by her two sons for CP.    Per pt she was in her usual state of health last night, she developed bandlike pressure at the epigastric level. Not associated w/ exertion, non radiating. Pt assumed it was gas and took OTC gas-ex w/ no improvement. Pt attempted to sleep but pain kept her awake through the night, pt endorses that as time passed her extremities felt cool and she had a slight tremor. In the morning the pain had improved somewhat but she still decided to present to quick care. From quick care she was sent to ER.     Trop significant 2.61--> 2.47 -->2.32    Right damian test: Positive    [id number] po /Plavix 75mg po given precath

## 2023-12-12 ENCOUNTER — TRANSCRIPTION ENCOUNTER (OUTPATIENT)
Age: 83
End: 2023-12-12

## 2023-12-12 VITALS
HEART RATE: 84 BPM | OXYGEN SATURATION: 97 % | DIASTOLIC BLOOD PRESSURE: 65 MMHG | SYSTOLIC BLOOD PRESSURE: 123 MMHG | RESPIRATION RATE: 20 BRPM

## 2023-12-12 LAB
ALBUMIN SERPL ELPH-MCNC: 3.6 G/DL — SIGNIFICANT CHANGE UP (ref 3.5–5.2)
ALBUMIN SERPL ELPH-MCNC: 3.6 G/DL — SIGNIFICANT CHANGE UP (ref 3.5–5.2)
ALP SERPL-CCNC: 73 U/L — SIGNIFICANT CHANGE UP (ref 30–115)
ALP SERPL-CCNC: 73 U/L — SIGNIFICANT CHANGE UP (ref 30–115)
ALT FLD-CCNC: 29 U/L — SIGNIFICANT CHANGE UP (ref 0–41)
ALT FLD-CCNC: 29 U/L — SIGNIFICANT CHANGE UP (ref 0–41)
ANION GAP SERPL CALC-SCNC: 12 MMOL/L — SIGNIFICANT CHANGE UP (ref 7–14)
ANION GAP SERPL CALC-SCNC: 12 MMOL/L — SIGNIFICANT CHANGE UP (ref 7–14)
AST SERPL-CCNC: 31 U/L — SIGNIFICANT CHANGE UP (ref 0–41)
AST SERPL-CCNC: 31 U/L — SIGNIFICANT CHANGE UP (ref 0–41)
BASOPHILS # BLD AUTO: 0.02 K/UL — SIGNIFICANT CHANGE UP (ref 0–0.2)
BASOPHILS # BLD AUTO: 0.02 K/UL — SIGNIFICANT CHANGE UP (ref 0–0.2)
BASOPHILS NFR BLD AUTO: 0.3 % — SIGNIFICANT CHANGE UP (ref 0–1)
BASOPHILS NFR BLD AUTO: 0.3 % — SIGNIFICANT CHANGE UP (ref 0–1)
BILIRUB SERPL-MCNC: 1.5 MG/DL — HIGH (ref 0.2–1.2)
BILIRUB SERPL-MCNC: 1.5 MG/DL — HIGH (ref 0.2–1.2)
BUN SERPL-MCNC: 12 MG/DL — SIGNIFICANT CHANGE UP (ref 10–20)
BUN SERPL-MCNC: 12 MG/DL — SIGNIFICANT CHANGE UP (ref 10–20)
CALCIUM SERPL-MCNC: 8.9 MG/DL — SIGNIFICANT CHANGE UP (ref 8.4–10.5)
CALCIUM SERPL-MCNC: 8.9 MG/DL — SIGNIFICANT CHANGE UP (ref 8.4–10.5)
CHLORIDE SERPL-SCNC: 104 MMOL/L — SIGNIFICANT CHANGE UP (ref 98–110)
CHLORIDE SERPL-SCNC: 104 MMOL/L — SIGNIFICANT CHANGE UP (ref 98–110)
CO2 SERPL-SCNC: 23 MMOL/L — SIGNIFICANT CHANGE UP (ref 17–32)
CO2 SERPL-SCNC: 23 MMOL/L — SIGNIFICANT CHANGE UP (ref 17–32)
CREAT SERPL-MCNC: 0.7 MG/DL — SIGNIFICANT CHANGE UP (ref 0.7–1.5)
CREAT SERPL-MCNC: 0.7 MG/DL — SIGNIFICANT CHANGE UP (ref 0.7–1.5)
EGFR: 86 ML/MIN/1.73M2 — SIGNIFICANT CHANGE UP
EGFR: 86 ML/MIN/1.73M2 — SIGNIFICANT CHANGE UP
EOSINOPHIL # BLD AUTO: 0.09 K/UL — SIGNIFICANT CHANGE UP (ref 0–0.7)
EOSINOPHIL # BLD AUTO: 0.09 K/UL — SIGNIFICANT CHANGE UP (ref 0–0.7)
EOSINOPHIL NFR BLD AUTO: 1.4 % — SIGNIFICANT CHANGE UP (ref 0–8)
EOSINOPHIL NFR BLD AUTO: 1.4 % — SIGNIFICANT CHANGE UP (ref 0–8)
GLUCOSE SERPL-MCNC: 99 MG/DL — SIGNIFICANT CHANGE UP (ref 70–99)
GLUCOSE SERPL-MCNC: 99 MG/DL — SIGNIFICANT CHANGE UP (ref 70–99)
HCT VFR BLD CALC: 37.7 % — SIGNIFICANT CHANGE UP (ref 37–47)
HCT VFR BLD CALC: 37.7 % — SIGNIFICANT CHANGE UP (ref 37–47)
HGB BLD-MCNC: 12.6 G/DL — SIGNIFICANT CHANGE UP (ref 12–16)
HGB BLD-MCNC: 12.6 G/DL — SIGNIFICANT CHANGE UP (ref 12–16)
IMM GRANULOCYTES NFR BLD AUTO: 0.3 % — SIGNIFICANT CHANGE UP (ref 0.1–0.3)
IMM GRANULOCYTES NFR BLD AUTO: 0.3 % — SIGNIFICANT CHANGE UP (ref 0.1–0.3)
LYMPHOCYTES # BLD AUTO: 1.12 K/UL — LOW (ref 1.2–3.4)
LYMPHOCYTES # BLD AUTO: 1.12 K/UL — LOW (ref 1.2–3.4)
LYMPHOCYTES # BLD AUTO: 17.7 % — LOW (ref 20.5–51.1)
LYMPHOCYTES # BLD AUTO: 17.7 % — LOW (ref 20.5–51.1)
MAGNESIUM SERPL-MCNC: 2.2 MG/DL — SIGNIFICANT CHANGE UP (ref 1.8–2.4)
MAGNESIUM SERPL-MCNC: 2.2 MG/DL — SIGNIFICANT CHANGE UP (ref 1.8–2.4)
MCHC RBC-ENTMCNC: 28.1 PG — SIGNIFICANT CHANGE UP (ref 27–31)
MCHC RBC-ENTMCNC: 28.1 PG — SIGNIFICANT CHANGE UP (ref 27–31)
MCHC RBC-ENTMCNC: 33.4 G/DL — SIGNIFICANT CHANGE UP (ref 32–37)
MCHC RBC-ENTMCNC: 33.4 G/DL — SIGNIFICANT CHANGE UP (ref 32–37)
MCV RBC AUTO: 84 FL — SIGNIFICANT CHANGE UP (ref 81–99)
MCV RBC AUTO: 84 FL — SIGNIFICANT CHANGE UP (ref 81–99)
MONOCYTES # BLD AUTO: 0.5 K/UL — SIGNIFICANT CHANGE UP (ref 0.1–0.6)
MONOCYTES # BLD AUTO: 0.5 K/UL — SIGNIFICANT CHANGE UP (ref 0.1–0.6)
MONOCYTES NFR BLD AUTO: 7.9 % — SIGNIFICANT CHANGE UP (ref 1.7–9.3)
MONOCYTES NFR BLD AUTO: 7.9 % — SIGNIFICANT CHANGE UP (ref 1.7–9.3)
NEUTROPHILS # BLD AUTO: 4.56 K/UL — SIGNIFICANT CHANGE UP (ref 1.4–6.5)
NEUTROPHILS # BLD AUTO: 4.56 K/UL — SIGNIFICANT CHANGE UP (ref 1.4–6.5)
NEUTROPHILS NFR BLD AUTO: 72.4 % — SIGNIFICANT CHANGE UP (ref 42.2–75.2)
NEUTROPHILS NFR BLD AUTO: 72.4 % — SIGNIFICANT CHANGE UP (ref 42.2–75.2)
NRBC # BLD: 0 /100 WBCS — SIGNIFICANT CHANGE UP (ref 0–0)
NRBC # BLD: 0 /100 WBCS — SIGNIFICANT CHANGE UP (ref 0–0)
PLATELET # BLD AUTO: 163 K/UL — SIGNIFICANT CHANGE UP (ref 130–400)
PLATELET # BLD AUTO: 163 K/UL — SIGNIFICANT CHANGE UP (ref 130–400)
PMV BLD: 12.9 FL — HIGH (ref 7.4–10.4)
PMV BLD: 12.9 FL — HIGH (ref 7.4–10.4)
POTASSIUM SERPL-MCNC: 3.9 MMOL/L — SIGNIFICANT CHANGE UP (ref 3.5–5)
POTASSIUM SERPL-MCNC: 3.9 MMOL/L — SIGNIFICANT CHANGE UP (ref 3.5–5)
POTASSIUM SERPL-SCNC: 3.9 MMOL/L — SIGNIFICANT CHANGE UP (ref 3.5–5)
POTASSIUM SERPL-SCNC: 3.9 MMOL/L — SIGNIFICANT CHANGE UP (ref 3.5–5)
PROT SERPL-MCNC: 5.9 G/DL — LOW (ref 6–8)
PROT SERPL-MCNC: 5.9 G/DL — LOW (ref 6–8)
RBC # BLD: 4.49 M/UL — SIGNIFICANT CHANGE UP (ref 4.2–5.4)
RBC # BLD: 4.49 M/UL — SIGNIFICANT CHANGE UP (ref 4.2–5.4)
RBC # FLD: 13.3 % — SIGNIFICANT CHANGE UP (ref 11.5–14.5)
RBC # FLD: 13.3 % — SIGNIFICANT CHANGE UP (ref 11.5–14.5)
SODIUM SERPL-SCNC: 139 MMOL/L — SIGNIFICANT CHANGE UP (ref 135–146)
SODIUM SERPL-SCNC: 139 MMOL/L — SIGNIFICANT CHANGE UP (ref 135–146)
TROPONIN T SERPL-MCNC: 2.6 NG/ML — CRITICAL HIGH
TROPONIN T SERPL-MCNC: 2.6 NG/ML — CRITICAL HIGH
TROPONIN T SERPL-MCNC: 3.03 NG/ML — CRITICAL HIGH
TROPONIN T SERPL-MCNC: 3.03 NG/ML — CRITICAL HIGH
WBC # BLD: 6.31 K/UL — SIGNIFICANT CHANGE UP (ref 4.8–10.8)
WBC # BLD: 6.31 K/UL — SIGNIFICANT CHANGE UP (ref 4.8–10.8)
WBC # FLD AUTO: 6.31 K/UL — SIGNIFICANT CHANGE UP (ref 4.8–10.8)
WBC # FLD AUTO: 6.31 K/UL — SIGNIFICANT CHANGE UP (ref 4.8–10.8)

## 2023-12-12 PROCEDURE — 99238 HOSP IP/OBS DSCHRG MGMT 30/<: CPT

## 2023-12-12 PROCEDURE — 99291 CRITICAL CARE FIRST HOUR: CPT | Mod: 25

## 2023-12-12 PROCEDURE — 93010 ELECTROCARDIOGRAM REPORT: CPT | Mod: 76

## 2023-12-12 RX ORDER — SODIUM CHLORIDE 9 MG/ML
500 INJECTION INTRAMUSCULAR; INTRAVENOUS; SUBCUTANEOUS ONCE
Refills: 0 | Status: COMPLETED | OUTPATIENT
Start: 2023-12-12 | End: 2023-12-12

## 2023-12-12 RX ORDER — CLOPIDOGREL BISULFATE 75 MG/1
1 TABLET, FILM COATED ORAL
Qty: 30 | Refills: 1
Start: 2023-12-12 | End: 2024-02-09

## 2023-12-12 RX ORDER — ASPIRIN/CALCIUM CARB/MAGNESIUM 324 MG
1 TABLET ORAL
Qty: 30 | Refills: 0
Start: 2023-12-12 | End: 2024-01-10

## 2023-12-12 RX ORDER — AMLODIPINE BESYLATE 2.5 MG/1
1 TABLET ORAL
Qty: 0 | Refills: 0 | DISCHARGE

## 2023-12-12 RX ORDER — METOPROLOL TARTRATE 50 MG
1 TABLET ORAL
Qty: 30 | Refills: 0
Start: 2023-12-12 | End: 2024-01-10

## 2023-12-12 RX ORDER — METOPROLOL TARTRATE 50 MG
25 TABLET ORAL DAILY
Refills: 0 | Status: DISCONTINUED | OUTPATIENT
Start: 2023-12-12 | End: 2023-12-12

## 2023-12-12 RX ORDER — ATORVASTATIN CALCIUM 80 MG/1
1 TABLET, FILM COATED ORAL
Qty: 30 | Refills: 1
Start: 2023-12-12 | End: 2024-02-09

## 2023-12-12 RX ADMIN — Medication 81 MILLIGRAM(S): at 12:16

## 2023-12-12 RX ADMIN — CLOPIDOGREL BISULFATE 75 MILLIGRAM(S): 75 TABLET, FILM COATED ORAL at 12:13

## 2023-12-12 RX ADMIN — CHLORHEXIDINE GLUCONATE 1 APPLICATION(S): 213 SOLUTION TOPICAL at 05:14

## 2023-12-12 RX ADMIN — SODIUM CHLORIDE 500 MILLILITER(S): 9 INJECTION INTRAMUSCULAR; INTRAVENOUS; SUBCUTANEOUS at 07:52

## 2023-12-12 RX ADMIN — Medication 25 MILLIGRAM(S): at 10:49

## 2023-12-12 RX ADMIN — FAMOTIDINE 20 MILLIGRAM(S): 10 INJECTION INTRAVENOUS at 12:14

## 2023-12-12 NOTE — DISCHARGE NOTE PROVIDER - HOSPITAL COURSE
Diagnosis: Inferior MI with 2ry RV mild impairment SP mid RCA stenting    82yo woman pmhx HTN presents from home accompanied by her two sons for CP.  Per pt she was in her usual state of health last night, she developed bandlike pressure at the epigastric level. Not associated w/ exertion, non radiating. Pt assumed it was gas and took OTC gas-ex w/ no improvement. Pt attempted to sleep but pain kept her awake through the night, pt endorses that as time passed her extremities felt cool and she had a slight tremmor. In the morning the pain had improved somewhat but she still decided to present to quick care. From quick care she was sent to ER.   Pt denies any SOB, CENTENO, diaphoresis, weakness, fevers, n/v/d, urinary sxs, dizziness. Of note pt lives on a second story condo and climbs 2 flights of stairs a day w/ no chest pain or SOB. Never smoker, no DM, active; parents lived to 82 years of age, unknown causes of death. Pt has no hx of heart dz in any first degree relatives.   ED   /72 HR 72 RR 17 100% T 97.9F  K 3.3 T Bili 1.9 AST 85 T 0.82  EKG   Pt evaluated by cardio, loaded asa/plavix, hep gtt started, K repleted; will be admitted to CCU for close monitoring and r/o ACS  EKG: Q waves III, aVF    Summary:   1. LV Ejection Fraction by Harrison's Method with a biplane EF of 64 %.   2. Spectral Doppler shows impaired relaxation pattern of left   ventricular myocardial filling (Grade I diastolic dysfunction).   3. Basal andmid anteroseptal mild hypokinesis, moderate inferobasal   hypokinesis.   4. Mildly reduced RV systolic function.   5. No evidence of mitral valve regurgitation.   6. Sclerotic aortic valve with normal opening.    Troponin T, Serum: 2.60: Critical value: ng/mL (12.12.23 @ 04:37)   Troponin T, Serum: 2.32: Critical value: ng/mL (12.11.23 @ 04:24)   Troponin T, Serum: 2.47: Critical value: ng/mL (12.10.23 @ 20:51)   Troponin T, Serum: 2.61: Critical value: ng/mL (12.10.23 @ 11:25)   Troponin T, Serum: 2.93: Critical value: ng/mL (12.10.23 @ 04:38)   Troponin T, Serum: 1.22: Critical value: ng/mL (12.08.23 @ 15:31)   Troponin T, Serum: 0.82: TYPE:(C=Critical, N=Notification, A=Abnormal) C     [x]6 Fr Radial Artery , TR band     IV Contrast: 100mL        Intervention:   PCI of mid-RCA    Implants:    WING 3.5 x 22    FINDINGS:   Coronary Dominance: Right   LM: Minor luminal irregularities.  LAD:   P-LAD Mild disease.   Mid-LAD moderate disease   Distal LAD Mild disease.   D1 Mild disease.   CX: P-LCX Mild disease.   Distal LCX: Mild disease. OM1 Mild disease.   RCA: P-RCA Mild disease.   Mid- percent occluded s/p PCI   LVEDP:16 mmHg     #NSTEMI  #HTN    Q waved lead III aVF with TWI fixed  Troponin continued to uptrend  EF 64%, with Basal and mid anteroseptal mild hypokinesis, moderate inferobasal   hypokinesis; Mildly reduced RV systolic function.    Plan:  -LHC/coronary angiogram today  -Continue ASA/Plavix  -Continue heparin gtt until angiogram  -Held off Metoprolol due to recurrent anirudh sinus fear of exacerbating potential RCA intervention induced bradyarythmias. Reinstated 24hrs post PCI.  -Continue atorvastatin 80 mg   -lipid panel: LDL Cholesterol Calculated: 46 mg/dL (12.10.23 @ 04:38)   - hold amlodipine    - HbA1C 5.5 % [12-08-23]   - TSH 2.27 (12/8/23)  - LDL 46, on atorvastatin 80mgs may consider reducing   Diagnosis: Inferior MI with 2ry RV mild impairment SP mid RCA stenting    84yo woman pmhx HTN presents from home accompanied by her two sons for CP.  Per pt she was in her usual state of health last night, she developed bandlike pressure at the epigastric level. Not associated w/ exertion, non radiating. Pt assumed it was gas and took OTC gas-ex w/ no improvement. Pt attempted to sleep but pain kept her awake through the night, pt endorses that as time passed her extremities felt cool and she had a slight tremmor. In the morning the pain had improved somewhat but she still decided to present to quick care. From quick care she was sent to ER.   Pt denies any SOB, CENTENO, diaphoresis, weakness, fevers, n/v/d, urinary sxs, dizziness. Of note pt lives on a second story condo and climbs 2 flights of stairs a day w/ no chest pain or SOB. Never smoker, no DM, active; parents lived to 82 years of age, unknown causes of death. Pt has no hx of heart dz in any first degree relatives.   ED   /72 HR 72 RR 17 100% T 97.9F  K 3.3 T Bili 1.9 AST 85 T 0.82  EKG   Pt evaluated by cardio, loaded asa/plavix, hep gtt started, K repleted; will be admitted to CCU for close monitoring and r/o ACS  EKG: Q waves III, aVF    Summary:   1. LV Ejection Fraction by Harrison's Method with a biplane EF of 64 %.   2. Spectral Doppler shows impaired relaxation pattern of left   ventricular myocardial filling (Grade I diastolic dysfunction).   3. Basal andmid anteroseptal mild hypokinesis, moderate inferobasal   hypokinesis.   4. Mildly reduced RV systolic function.   5. No evidence of mitral valve regurgitation.   6. Sclerotic aortic valve with normal opening.    Troponin T, Serum: 2.60: Critical value: ng/mL (12.12.23 @ 04:37)   Troponin T, Serum: 2.32: Critical value: ng/mL (12.11.23 @ 04:24)   Troponin T, Serum: 2.47: Critical value: ng/mL (12.10.23 @ 20:51)   Troponin T, Serum: 2.61: Critical value: ng/mL (12.10.23 @ 11:25)   Troponin T, Serum: 2.93: Critical value: ng/mL (12.10.23 @ 04:38)   Troponin T, Serum: 1.22: Critical value: ng/mL (12.08.23 @ 15:31)   Troponin T, Serum: 0.82: TYPE:(C=Critical, N=Notification, A=Abnormal) C     [x]6 Fr Radial Artery , TR band     IV Contrast: 100mL        Intervention:   PCI of mid-RCA    Implants:    WING 3.5 x 22    FINDINGS:   Coronary Dominance: Right   LM: Minor luminal irregularities.  LAD:   P-LAD Mild disease.   Mid-LAD moderate disease   Distal LAD Mild disease.   D1 Mild disease.   CX: P-LCX Mild disease.   Distal LCX: Mild disease. OM1 Mild disease.   RCA: P-RCA Mild disease.   Mid- percent occluded s/p PCI   LVEDP:16 mmHg     #NSTEMI  #HTN    Q waved lead III aVF with TWI fixed  Troponin continued to uptrend  EF 64%, with Basal and mid anteroseptal mild hypokinesis, moderate inferobasal   hypokinesis; Mildly reduced RV systolic function.    Plan:  -LHC/coronary angiogram today  -Continue ASA/Plavix  -Continue heparin gtt until angiogram  -Held off Metoprolol due to recurrent anirudh sinus fear of exacerbating potential RCA intervention induced bradyarythmias. Reinstated 24hrs post PCI.  -Continue atorvastatin 80 mg   -lipid panel: LDL Cholesterol Calculated: 46 mg/dL (12.10.23 @ 04:38)   - hold amlodipine    - HbA1C 5.5 % [12-08-23]   - TSH 2.27 (12/8/23)  - LDL 46, on atorvastatin 80mgs may consider reducing   Diagnosis: Inferior MI with 2ry RV mild impairment SP mid RCA stenting    82yo woman pmhx HTN presents from home accompanied by her two sons for CP.  Per pt she was in her usual state of health last night, she developed bandlike pressure at the epigastric level. Not associated w/ exertion, non radiating. Pt assumed it was gas and took OTC gas-ex w/ no improvement. Pt attempted to sleep but pain kept her awake through the night, pt endorses that as time passed her extremities felt cool and she had a slight tremmor. In the morning the pain had improved somewhat but she still decided to present to quick care. From quick care she was sent to ER.   Pt denies any SOB, CENTENO, diaphoresis, weakness, fevers, n/v/d, urinary sxs, dizziness. Of note pt lives on a second story condo and climbs 2 flights of stairs a day w/ no chest pain or SOB. Never smoker, no DM, active; parents lived to 82 years of age, unknown causes of death. Pt has no hx of heart dz in any first degree relatives.     EKG   Pt evaluated by cardio, loaded asa/plavix, hep gtt started, K repleted; will be admitted to CCU for close monitoring and r/o ACS  EKG: Q waves III, aVF    Summary:   1. LV Ejection Fraction by Harrison's Method with a biplane EF of 64 %.   2. Spectral Doppler shows impaired relaxation pattern of left   ventricular myocardial filling (Grade I diastolic dysfunction).   3. Basal andmid anteroseptal mild hypokinesis, moderate inferobasal   hypokinesis.   4. Mildly reduced RV systolic function.   5. No evidence of mitral valve regurgitation.   6. Sclerotic aortic valve with normal opening.    Troponin T, Serum: 2.60: Critical value: ng/mL (12.12.23 @ 04:37)   Troponin T, Serum: 2.32: Critical value: ng/mL (12.11.23 @ 04:24)   Troponin T, Serum: 2.47: Critical value: ng/mL (12.10.23 @ 20:51)   Troponin T, Serum: 2.61: Critical value: ng/mL (12.10.23 @ 11:25)   Troponin T, Serum: 2.93: Critical value: ng/mL (12.10.23 @ 04:38)   Troponin T, Serum: 1.22: Critical value: ng/mL (12.08.23 @ 15:31)   Troponin T, Serum: 0.82: TYPE:(C=Critical, N=Notification, A=Abnormal) C     [x]6 Fr Radial Artery , TR band     IV Contrast: 100mL        Intervention:   PCI of mid-RCA    Implants:    WING 3.5 x 22    FINDINGS:   Coronary Dominance: Right   LM: Minor luminal irregularities.  LAD:   P-LAD Mild disease.   Mid-LAD moderate disease   Distal LAD Mild disease.   D1 Mild disease.   CX: P-LCX Mild disease.   Distal LCX: Mild disease. OM1 Mild disease.   RCA: P-RCA Mild disease.   Mid- percent occluded s/p PCI   LVEDP:16 mmHg     #NSTEMI  #HTN    Q waved lead III aVF with TWI fixed  Troponin continued to uptrend  EF 64%, with Basal and mid anteroseptal mild hypokinesis, moderate inferobasal   hypokinesis; Mildly reduced RV systolic function.    Plan:  -LHC/coronary angiogram today  -Continue ASA/Plavix  -Continue heparin gtt until angiogram  -Held off Metoprolol due to recurrent anirudh sinus fear of exacerbating potential RCA intervention induced bradyarythmias. Reinstated 24hrs post PCI.  -Continue atorvastatin 80 mg   -lipid panel: LDL Cholesterol Calculated: 46 mg/dL (12.10.23 @ 04:38)   - hold amlodipine    - HbA1C 5.5 % [12-08-23]   - TSH 2.27 (12/8/23)  - LDL 46, on atorvastatin 80mgs may consider reducing   Diagnosis: Inferior MI with 2ry RV mild impairment SP mid RCA stenting    84yo woman pmhx HTN presents from home accompanied by her two sons for CP.  Per pt she was in her usual state of health last night, she developed bandlike pressure at the epigastric level. Not associated w/ exertion, non radiating. Pt assumed it was gas and took OTC gas-ex w/ no improvement. Pt attempted to sleep but pain kept her awake through the night, pt endorses that as time passed her extremities felt cool and she had a slight tremmor. In the morning the pain had improved somewhat but she still decided to present to quick care. From quick care she was sent to ER.   Pt denies any SOB, CENTENO, diaphoresis, weakness, fevers, n/v/d, urinary sxs, dizziness. Of note pt lives on a second story condo and climbs 2 flights of stairs a day w/ no chest pain or SOB. Never smoker, no DM, active; parents lived to 82 years of age, unknown causes of death. Pt has no hx of heart dz in any first degree relatives.     EKG   Pt evaluated by cardio, loaded asa/plavix, hep gtt started, K repleted; will be admitted to CCU for close monitoring and r/o ACS  EKG: Q waves III, aVF    Summary:   1. LV Ejection Fraction by Harrison's Method with a biplane EF of 64 %.   2. Spectral Doppler shows impaired relaxation pattern of left   ventricular myocardial filling (Grade I diastolic dysfunction).   3. Basal andmid anteroseptal mild hypokinesis, moderate inferobasal   hypokinesis.   4. Mildly reduced RV systolic function.   5. No evidence of mitral valve regurgitation.   6. Sclerotic aortic valve with normal opening.    Troponin T, Serum: 2.60: Critical value: ng/mL (12.12.23 @ 04:37)   Troponin T, Serum: 2.32: Critical value: ng/mL (12.11.23 @ 04:24)   Troponin T, Serum: 2.47: Critical value: ng/mL (12.10.23 @ 20:51)   Troponin T, Serum: 2.61: Critical value: ng/mL (12.10.23 @ 11:25)   Troponin T, Serum: 2.93: Critical value: ng/mL (12.10.23 @ 04:38)   Troponin T, Serum: 1.22: Critical value: ng/mL (12.08.23 @ 15:31)   Troponin T, Serum: 0.82: TYPE:(C=Critical, N=Notification, A=Abnormal) C     [x]6 Fr Radial Artery , TR band     IV Contrast: 100mL        Intervention:   PCI of mid-RCA    Implants:    WING 3.5 x 22    FINDINGS:   Coronary Dominance: Right   LM: Minor luminal irregularities.  LAD:   P-LAD Mild disease.   Mid-LAD moderate disease   Distal LAD Mild disease.   D1 Mild disease.   CX: P-LCX Mild disease.   Distal LCX: Mild disease. OM1 Mild disease.   RCA: P-RCA Mild disease.   Mid- percent occluded s/p PCI   LVEDP:16 mmHg     #NSTEMI  #HTN    Q waved lead III aVF with TWI fixed  Troponin continued to uptrend  EF 64%, with Basal and mid anteroseptal mild hypokinesis, moderate inferobasal   hypokinesis; Mildly reduced RV systolic function.    Plan:  -LHC/coronary angiogram today  -Continue ASA/Plavix  -Continue heparin gtt until angiogram  -Held off Metoprolol due to recurrent anirudh sinus fear of exacerbating potential RCA intervention induced bradyarythmias. Reinstated 24hrs post PCI.  -Continue atorvastatin 80 mg   -lipid panel: LDL Cholesterol Calculated: 46 mg/dL (12.10.23 @ 04:38)   - hold amlodipine    - HbA1C 5.5 % [12-08-23]   - TSH 2.27 (12/8/23)  - LDL 46, on atorvastatin 80mgs may consider reducing

## 2023-12-12 NOTE — BH DISCHARGE NOTE NURSING/SOCIAL WORK/PSYCH REHAB - PATIENT PORTAL LINK FT
You can access the FollowMyHealth Patient Portal offered by [facility] by registering at the following website: [email or web address]. By joining [employer]’s FollowMyHealth portal, you will also be able to view your health information using other applications (apps) compatible with our system. You can access the FollowMyHealth Patient Portal offered by [facility] by registering at the following website: [email or web address]. By joining [employer]’s FollowMyHealth portal, you will also be able to view your health information using other applications (apps) compatible with our system.

## 2023-12-12 NOTE — DISCHARGE NOTE PROVIDER - NSDCCPCAREPLAN_GEN_ALL_CORE_FT
PRINCIPAL DISCHARGE DIAGNOSIS  Diagnosis: NSTEMI (non-ST elevation myocardial infarction)  Assessment and Plan of Treatment: A heart attack happens when the blood vessels that supply blood to your heart are blocked. This can damage your heart or lead to an abnormal heart rhythm or heart failure. A heart attack is also called a myocardial infarction.     DISCHARGE INSTRUCTIONS:  Call your local emergency number (911 in the [place]) for any of the following:  Squeezing, pressure, or pain in your chest  You may also have any of the following:  Discomfort or pain in your back, neck, jaw, stomach, or arm  Shortness of breath  Nausea or vomiting  Lightheadedness or a sudden cold sweat  Seek care immediately if:  You are tired, bleeding, have palpitations, swelling, or decerased urination.   Blood thinners help prevent blood clots. Clots can cause strokes, heart attacks, and death. The following are general safety guidelines to follow while you are taking a blood thinner:  Watch for bleeding and bruising while you take blood thinners.   Do not start or stop any other medicines unless your healthcare provider tells you to. Many medicines cannot be used with blood thinners.  Take your blood thinner exactly as prescribed by your healthcare provider.   Cholesterol medicine decreases cholesterol and the amount of plaque in your blood.  Do not take certain medicines without asking your healthcare provider first. These include NSAIDs, herbal or vitamin supplements, or hormones (estrogen or progestin).  Take your medicine as directed.  Manage other health conditions:  Diabetes and high cholesterol increases your risk for another heart attack and stroke.   Check your blood pressure at home:  Limit Salt intake.  Do not smoke.   Exercise as directed.   Walking for Exercise  Maintain a healthy weight  Manage stress.

## 2023-12-12 NOTE — DISCHARGE NOTE NURSING/CASE MANAGEMENT/SOCIAL WORK - PATIENT PORTAL LINK FT
You can access the FollowMyHealth Patient Portal offered by [facility] by registering at the following website: [email or web address]. By joining [employer]’s FollowMyHealth portal, you will also be able to view your health information using other applications (apps) compatible with our system. You can access the FollowMyHealth Patient Portal offered by [facility] by registering at the following website: [email or web address]. By joining [employer]’s FollowMyHealth portal, you will also be able to view your health information using other applications (apps) compatible with our system.

## 2023-12-12 NOTE — PROGRESS NOTE ADULT - SUBJECTIVE AND OBJECTIVE BOX
Cardiology Follow up s/p PCI    VIBHA RICHARDS   83y Female  PAST MEDICAL & SURGICAL HISTORY:    Hypertension      History of back surgery  age 28 - disc herniation surgery    HPI:  84yo woman pmhx HTN presents from home accompanied by her two sons for CP.    Per pt she was in her usual state of health last night, she developed bandlike pressure at the epigastric level. Not associated w/ exertion, non radiating. Pt assumed it was gas and took OTC gas-ex w/ no improvement. Pt attempted to sleep but pain kept her awake through the night, pt endorses that as time passed her extremities felt cool and she had a slight tremmor. In the morning the pain had improved somewhat but she still decided to present to quick care. From quick care she was sent to ER.     Pt denies any SOB, CENTENO, diaphoresis, weakness, fevers, n/v/d, urinary sxs, dizziness. Of note pt lives on a second story condo and climbs 2 flights of stairs a day w/ no chest pain or SOB. Never smoker, no DM, active; parents lived to 82 years of age, unknown causes of death. Pt has no hx of heart dz in any first degree relatives.     ED   /72 HR 72 RR 17 100% T 97.9F  K 3.3 T Bili 1.9 AST 85 T 0.82  EKG     Pt evaluated by cardio, loaded asa/plavix, hep gtt started, K repleted; will be admitted to CCU for close monitoring and r/o ACS     (08 Dec 2023 15:14)    Allergies    No Known Allergies    Intolerances      Patient seen and examined at bedside. No acute events overnight.  Patient without complaints. Pt ambulated without issues/symptoms  Denies CP, SOB, palpitations, or dizziness  No events on telemetry overnight    Vital Signs Last 24 Hrs  T(C): 36.7 (12 Dec 2023 08:00), Max: 37.1 (11 Dec 2023 19:00)  T(F): 98 (12 Dec 2023 08:00), Max: 98.8 (11 Dec 2023 19:00)  HR: 76 (12 Dec 2023 10:00) (60 - 89)  BP: 98/57 (12 Dec 2023 10:00) (94/65 - 151/70)  BP(mean): 74 (12 Dec 2023 10:00) (64 - 103)  RR: 36 (12 Dec 2023 10:00) (15 - 43)  SpO2: 96% (12 Dec 2023 10:00) (93% - 99%)    Parameters below as of 12 Dec 2023 10:00  Patient On (Oxygen Delivery Method): room air        MEDICATIONS  (STANDING):  artificial  tears Solution 1 Drop(s) Both EYES every 12 hours  aspirin enteric coated 81 milliGRAM(s) Oral daily  atorvastatin 80 milliGRAM(s) Oral at bedtime  chlorhexidine 2% Cloths 1 Application(s) Topical <User Schedule>  clopidogrel Tablet 75 milliGRAM(s) Oral daily  famotidine    Tablet 20 milliGRAM(s) Oral daily  influenza  Vaccine (HIGH DOSE) 0.7 milliLiter(s) IntraMuscular once  metoprolol succinate ER 25 milliGRAM(s) Oral daily    MEDICATIONS  (PRN):      REVIEW OF SYSTEMS:          All negative except as mentioned in HPI    PHYSICAL EXAM:           CONSTITUTIONAL: Well-developed; well-nourished; in no acute distress  	SKIN: warm, dry  	HEAD: Normocephalic; atraumatic  	EYES: PERRL.  	ENT: No nasal discharge, airway clear, mucous membranes moist  	NECK: Supple; non tender.  	CARD: +S1, +S2, no murmurs, gallops, or rubs. Regular rate and rhythm    	RESP: No wheezes, rales or rhonchi. CTA B/L  	ABD: soft ntnd, + BS x 4 quadrants  	EXT: moves all extremities,  no clubbing, cyanosis or edema  	NEURO: Alert and oriented x3, no focal deficits          PSYCH: Cooperative, appropriate          VASCULAR:  + Rad / + PTs / +  DPs          EXTREMITY:             Right Radial: pressure dressing removed, access site soft, no hematoma, ecchymosis noted, no pain, + pulses, no sign of infection, no numbness            ECG:   < from: 12 Lead ECG (12.12.23 @ 05:42) >    Ventricular Rate 63 BPM    Atrial Rate 63 BPM    P-R Interval 150 ms    QRS Duration 70 ms    Q-T Interval 432 ms    QTC Calculation(Bazett) 442 ms    P Axis 58 degrees    R Axis -24 degrees    T Axis 89 degrees    Diagnosis Line Normal sinus rhythm  Inferior infarct , age undetermined  Abnormal ECG    Confirmed by Corrie Roman MD ([id number]) on 12/12/2023 11:02:32 AM                                                                                    2D ECHO:  < from: TTE Echo Complete w/ Contrast w/ Doppler (12.09.23 @ 09:20) >  Summary:   1. LV Ejection Fraction by Harrison's Method with a biplane EF of 64 %.   2. Spectral Doppler shows impaired relaxation pattern of left   ventricular myocardial filling (Grade I diastolic dysfunction).   3. Basal andmid anteroseptal mild hypokinesis, moderate inferobasal   hypokinesis.   4. Mildly reduced RV systolic function.   5. No evidence of mitral valve regurgitation.   6. Sclerotic aortic valve with normal opening.    LABS:                        12.6   6.31  )-----------( 163      ( 12 Dec 2023 04:37 )             37.7     12-12    139  |  104  |  12  ----------------------------<  99  3.9   |  23  |  0.7    Ca    8.9      12 Dec 2023 04:37  Phos  3.7     12-11  Mg     2.2     12-12    TPro  5.9<L>  /  Alb  3.6  /  TBili  1.5<H>  /  DBili  x   /  AST  31  /  ALT  29  /  AlkPhos  73  12-12    CARDIAC MARKERS ( 12 Dec 2023 04:37 )  x     / 2.60 ng/mL / x     / x     / x      CARDIAC MARKERS ( 12 Dec 2023 01:02 )  x     / 3.03 ng/mL / x     / x     / x      CARDIAC MARKERS ( 11 Dec 2023 04:24 )  x     / 2.32 ng/mL / x     / x     / x      CARDIAC MARKERS ( 10 Dec 2023 20:51 )  x     / 2.47 ng/mL / x     / x     / x      CARDIAC MARKERS ( 10 Dec 2023 11:25 )  x     / 2.61 ng/mL / x     / x     / x        Magnesium: 2.2 mg/dL [1.8 - 2.4] (12-12-23 @ 04:37)  LIVER FUNCTIONS - ( 12 Dec 2023 04:37 )  Alb: 3.6 g/dL / Pro: 5.9 g/dL / ALK PHOS: 73 U/L / ALT: 29 U/L / AST: 31 U/L / GGT: x           PTT - ( 11 Dec 2023 04:24 )  PTT:62.4 sec  I&O's Summary    11 Dec 2023 07:01  -  12 Dec 2023 07:00  --------------------------------------------------------  IN: 370 mL / OUT: 900 mL / NET: -530 mL    12 Dec 2023 07:01  -  12 Dec 2023 11:01  --------------------------------------------------------  IN: 500 mL / OUT: 0 mL / NET: 500 mL      BNP Magnesium: 2.2 mg/dL [1.8 - 2.4] (12-12-23 @ 04:37)    LDL Cholesterol Calculated: 46 mg/dL (12.10.23 @ 04:38)   A1C with Estimated Average Glucose Result: 5.5    A/P:  I discussed the case with Cardiologist Dr. Yifan Stone and recommend the following:  S/P PCI:  Access & Closure:     [x]6 Fr Radial Artery , TR band     IV Contrast: 100mL        Intervention:   PCI of mid-RCA    Implants:    WING 3.5 x 22    FINDINGS:     Coronary Dominance: Right     LM: Minor luminal irregularities.     LAD:   P-LAD Mild disease.   Mid-LAD moderate disease   Distal LAD Mild disease.   D1 Mild disease.   CX:   P-LCX Mild disease.   Distal LCX: Mild disease.   OM1 Mild disease.   RCA:   P-RCA Mild disease.   Mid- percent occluded s/p PCI      LVEDP:16 mmHg                                          Care as per CCU team                   OOB to chair, ambulate with assistance                    No ACEi/ARB due to low B/P, will reassess as OP                   Hold B-Blocker if HR < 60 and SBP <=100                   Encourage oral hydration                    Keep K = 4, Mg = 2                   Monitor access site       	     Continue DAPT ( Aspirin 81 mg PO Daily and Plavix 75 mg Daily ), B-Blocker, Statin Therapy, H2 Blocker                   Patient given 30 day supply of ( Aspirin 81 mg daily and Plavix 75 mg daily ) to take at home                   Patient agreeing to take DAPT for at least one year or as directed by cardiologist                    Pt given instructions on importance of taking antiplatelet medication or risk acute stent thrombosis/death                   Post cath instructions, access site care and activity restrictions reviewed with patient                     Discussed with patient to return to hospital if experience chest pain, shortness breath, dizziness and site bleeding                   Aggressive risk factor modification, diet counseling, smoking cessation discussed with patient                       Can discharge patient from interventional cardiac standpoint after ambulating without symptoms and access site wnl, ECG and blood work reviewed                    Benefits of Cardiac Rehab discussed with patient, All documents sent to [facility]. Patient instructed to call and make first                               appointment after first f/u visit with Cardiologist                    Follow up with Cardiology Dr. Yifan Stone in two weeks. Patient instructed to call and make an appointment       Discharge instructions as follows, when ready to d/c:    Activity:  - Do not drive or operate heavy machinery for 24 hours.  - Limit your physical or any strenuous activity for 2 weeks after angioplasty and 48 hours for angiogram. Support the groin site with your hand when you sneeze or cough. No heavy lifting ( objects more then 10 pounds).  - For wrist access, avoid using affected arm for 24 hours after removal of dressing and avoid heavy lifting for 7 days.  Hygiene:  - After 24 hours, you may shower and remove the dressing from the site. Do not tub bathe for one week. Do not rub or apply lotion, cream, powder to the affected site. Leave it open to air.   Diet:   - You may resume your diet. Low Sodium. Low Fat, Low Cholesterol.  If Diabetic - Carbohydrate Consistent Diet.      - Drink extra fluid unless otherwise advised.   Special Instructions:  - Bruising or black and blue at the puncture site is possible.  - If there is bleeding from the puncture site (groin or wrist) apply direct firm pressure on the site and call 911.  - Any sudden swelling, redness, fever, discharge or severe pain, call your physician or call the cath lab.   - If you notice any scab formation in the area avoid touching the site and allow it to heal.  - Numbness or "pins and needle" sensation in the affected arm, hand, leg or if the affected site become cool to touch or pale that persist for extended period of time call your physician immediately to be checked.  - If you developed chest pain, not relieved by your usual routine medication, fainting, lethargy, weakness, report to the nearest emergency room.   - Inform your Dentist or Surgeon if you are taking Aspirin or any antiplatelet medications. Report any bleeding in your urine or stool.   Medications:  - Soreness or tenderness at the site is possible it will diminish over time. You may take Tylenol every 4-6 hours as needed. Nothing stronger is needed.  - If you are diabetic and taking medication containing Metformin, do not take them for 48 hours after the procedure.     Any questions call Cardiac Cath Lab at [phone] or [phone], Monday - Friday from 7 - 9 pm.                                      Cardiology Follow up s/p PCI    VIBHA RICHARDS   83y Female  PAST MEDICAL & SURGICAL HISTORY:    Hypertension      History of back surgery  age 28 - disc herniation surgery    HPI:  82yo woman pmhx HTN presents from home accompanied by her two sons for CP.    Per pt she was in her usual state of health last night, she developed bandlike pressure at the epigastric level. Not associated w/ exertion, non radiating. Pt assumed it was gas and took OTC gas-ex w/ no improvement. Pt attempted to sleep but pain kept her awake through the night, pt endorses that as time passed her extremities felt cool and she had a slight tremmor. In the morning the pain had improved somewhat but she still decided to present to quick care. From quick care she was sent to ER.     Pt denies any SOB, CENTENO, diaphoresis, weakness, fevers, n/v/d, urinary sxs, dizziness. Of note pt lives on a second story condo and climbs 2 flights of stairs a day w/ no chest pain or SOB. Never smoker, no DM, active; parents lived to 82 years of age, unknown causes of death. Pt has no hx of heart dz in any first degree relatives.     ED   /72 HR 72 RR 17 100% T 97.9F  K 3.3 T Bili 1.9 AST 85 T 0.82  EKG     Pt evaluated by cardio, loaded asa/plavix, hep gtt started, K repleted; will be admitted to CCU for close monitoring and r/o ACS     (08 Dec 2023 15:14)    Allergies    No Known Allergies    Intolerances      Patient seen and examined at bedside. No acute events overnight.  Patient without complaints. Pt ambulated without issues/symptoms  Denies CP, SOB, palpitations, or dizziness  No events on telemetry overnight    Vital Signs Last 24 Hrs  T(C): 36.7 (12 Dec 2023 08:00), Max: 37.1 (11 Dec 2023 19:00)  T(F): 98 (12 Dec 2023 08:00), Max: 98.8 (11 Dec 2023 19:00)  HR: 76 (12 Dec 2023 10:00) (60 - 89)  BP: 98/57 (12 Dec 2023 10:00) (94/65 - 151/70)  BP(mean): 74 (12 Dec 2023 10:00) (64 - 103)  RR: 36 (12 Dec 2023 10:00) (15 - 43)  SpO2: 96% (12 Dec 2023 10:00) (93% - 99%)    Parameters below as of 12 Dec 2023 10:00  Patient On (Oxygen Delivery Method): room air        MEDICATIONS  (STANDING):  artificial  tears Solution 1 Drop(s) Both EYES every 12 hours  aspirin enteric coated 81 milliGRAM(s) Oral daily  atorvastatin 80 milliGRAM(s) Oral at bedtime  chlorhexidine 2% Cloths 1 Application(s) Topical <User Schedule>  clopidogrel Tablet 75 milliGRAM(s) Oral daily  famotidine    Tablet 20 milliGRAM(s) Oral daily  influenza  Vaccine (HIGH DOSE) 0.7 milliLiter(s) IntraMuscular once  metoprolol succinate ER 25 milliGRAM(s) Oral daily    MEDICATIONS  (PRN):      REVIEW OF SYSTEMS:          All negative except as mentioned in HPI    PHYSICAL EXAM:           CONSTITUTIONAL: Well-developed; well-nourished; in no acute distress  	SKIN: warm, dry  	HEAD: Normocephalic; atraumatic  	EYES: PERRL.  	ENT: No nasal discharge, airway clear, mucous membranes moist  	NECK: Supple; non tender.  	CARD: +S1, +S2, no murmurs, gallops, or rubs. Regular rate and rhythm    	RESP: No wheezes, rales or rhonchi. CTA B/L  	ABD: soft ntnd, + BS x 4 quadrants  	EXT: moves all extremities,  no clubbing, cyanosis or edema  	NEURO: Alert and oriented x3, no focal deficits          PSYCH: Cooperative, appropriate          VASCULAR:  + Rad / + PTs / +  DPs          EXTREMITY:             Right Radial: pressure dressing removed, access site soft, no hematoma, ecchymosis noted, no pain, + pulses, no sign of infection, no numbness            ECG:   < from: 12 Lead ECG (12.12.23 @ 05:42) >    Ventricular Rate 63 BPM    Atrial Rate 63 BPM    P-R Interval 150 ms    QRS Duration 70 ms    Q-T Interval 432 ms    QTC Calculation(Bazett) 442 ms    P Axis 58 degrees    R Axis -24 degrees    T Axis 89 degrees    Diagnosis Line Normal sinus rhythm  Inferior infarct , age undetermined  Abnormal ECG    Confirmed by Corrie Roman MD ([id number]) on 12/12/2023 11:02:32 AM                                                                                    2D ECHO:  < from: TTE Echo Complete w/ Contrast w/ Doppler (12.09.23 @ 09:20) >  Summary:   1. LV Ejection Fraction by Harrison's Method with a biplane EF of 64 %.   2. Spectral Doppler shows impaired relaxation pattern of left   ventricular myocardial filling (Grade I diastolic dysfunction).   3. Basal andmid anteroseptal mild hypokinesis, moderate inferobasal   hypokinesis.   4. Mildly reduced RV systolic function.   5. No evidence of mitral valve regurgitation.   6. Sclerotic aortic valve with normal opening.    LABS:                        12.6   6.31  )-----------( 163      ( 12 Dec 2023 04:37 )             37.7     12-12    139  |  104  |  12  ----------------------------<  99  3.9   |  23  |  0.7    Ca    8.9      12 Dec 2023 04:37  Phos  3.7     12-11  Mg     2.2     12-12    TPro  5.9<L>  /  Alb  3.6  /  TBili  1.5<H>  /  DBili  x   /  AST  31  /  ALT  29  /  AlkPhos  73  12-12    CARDIAC MARKERS ( 12 Dec 2023 04:37 )  x     / 2.60 ng/mL / x     / x     / x      CARDIAC MARKERS ( 12 Dec 2023 01:02 )  x     / 3.03 ng/mL / x     / x     / x      CARDIAC MARKERS ( 11 Dec 2023 04:24 )  x     / 2.32 ng/mL / x     / x     / x      CARDIAC MARKERS ( 10 Dec 2023 20:51 )  x     / 2.47 ng/mL / x     / x     / x      CARDIAC MARKERS ( 10 Dec 2023 11:25 )  x     / 2.61 ng/mL / x     / x     / x        Magnesium: 2.2 mg/dL [1.8 - 2.4] (12-12-23 @ 04:37)  LIVER FUNCTIONS - ( 12 Dec 2023 04:37 )  Alb: 3.6 g/dL / Pro: 5.9 g/dL / ALK PHOS: 73 U/L / ALT: 29 U/L / AST: 31 U/L / GGT: x           PTT - ( 11 Dec 2023 04:24 )  PTT:62.4 sec  I&O's Summary    11 Dec 2023 07:01  -  12 Dec 2023 07:00  --------------------------------------------------------  IN: 370 mL / OUT: 900 mL / NET: -530 mL    12 Dec 2023 07:01  -  12 Dec 2023 11:01  --------------------------------------------------------  IN: 500 mL / OUT: 0 mL / NET: 500 mL      BNP Magnesium: 2.2 mg/dL [1.8 - 2.4] (12-12-23 @ 04:37)    LDL Cholesterol Calculated: 46 mg/dL (12.10.23 @ 04:38)   A1C with Estimated Average Glucose Result: 5.5    A/P:  I discussed the case with Cardiologist Dr. Yifan Stone and recommend the following:  S/P PCI:  Access & Closure:     [x]6 Fr Radial Artery , TR band     IV Contrast: 100mL        Intervention:   PCI of mid-RCA    Implants:    WING 3.5 x 22    FINDINGS:     Coronary Dominance: Right     LM: Minor luminal irregularities.     LAD:   P-LAD Mild disease.   Mid-LAD moderate disease   Distal LAD Mild disease.   D1 Mild disease.   CX:   P-LCX Mild disease.   Distal LCX: Mild disease.   OM1 Mild disease.   RCA:   P-RCA Mild disease.   Mid- percent occluded s/p PCI      LVEDP:16 mmHg                                          Care as per CCU team                   OOB to chair, ambulate with assistance                    No ACEi/ARB due to low B/P, will reassess as OP                   Hold B-Blocker if HR < 60 and SBP <=100                   Encourage oral hydration                    Keep K = 4, Mg = 2                   Monitor access site       	     Continue DAPT ( Aspirin 81 mg PO Daily and Plavix 75 mg Daily ), B-Blocker, Statin Therapy, H2 Blocker                   Patient given 30 day supply of ( Aspirin 81 mg daily and Plavix 75 mg daily ) to take at home                   Patient agreeing to take DAPT for at least one year or as directed by cardiologist                    Pt given instructions on importance of taking antiplatelet medication or risk acute stent thrombosis/death                   Post cath instructions, access site care and activity restrictions reviewed with patient                     Discussed with patient to return to hospital if experience chest pain, shortness breath, dizziness and site bleeding                   Aggressive risk factor modification, diet counseling, smoking cessation discussed with patient                       Can discharge patient from interventional cardiac standpoint after ambulating without symptoms and access site wnl, ECG and blood work reviewed                    Benefits of Cardiac Rehab discussed with patient, All documents sent to [facility]. Patient instructed to call and make first                               appointment after first f/u visit with Cardiologist                    Follow up with Cardiology Dr. Yifan Stone in two weeks. Patient instructed to call and make an appointment       Discharge instructions as follows, when ready to d/c:    Activity:  - Do not drive or operate heavy machinery for 24 hours.  - Limit your physical or any strenuous activity for 2 weeks after angioplasty and 48 hours for angiogram. Support the groin site with your hand when you sneeze or cough. No heavy lifting ( objects more then 10 pounds).  - For wrist access, avoid using affected arm for 24 hours after removal of dressing and avoid heavy lifting for 7 days.  Hygiene:  - After 24 hours, you may shower and remove the dressing from the site. Do not tub bathe for one week. Do not rub or apply lotion, cream, powder to the affected site. Leave it open to air.   Diet:   - You may resume your diet. Low Sodium. Low Fat, Low Cholesterol.  If Diabetic - Carbohydrate Consistent Diet.      - Drink extra fluid unless otherwise advised.   Special Instructions:  - Bruising or black and blue at the puncture site is possible.  - If there is bleeding from the puncture site (groin or wrist) apply direct firm pressure on the site and call 911.  - Any sudden swelling, redness, fever, discharge or severe pain, call your physician or call the cath lab.   - If you notice any scab formation in the area avoid touching the site and allow it to heal.  - Numbness or "pins and needle" sensation in the affected arm, hand, leg or if the affected site become cool to touch or pale that persist for extended period of time call your physician immediately to be checked.  - If you developed chest pain, not relieved by your usual routine medication, fainting, lethargy, weakness, report to the nearest emergency room.   - Inform your Dentist or Surgeon if you are taking Aspirin or any antiplatelet medications. Report any bleeding in your urine or stool.   Medications:  - Soreness or tenderness at the site is possible it will diminish over time. You may take Tylenol every 4-6 hours as needed. Nothing stronger is needed.  - If you are diabetic and taking medication containing Metformin, do not take them for 48 hours after the procedure.     Any questions call Cardiac Cath Lab at [phone] or [phone], Monday - Friday from 7 - 9 pm.

## 2023-12-12 NOTE — DISCHARGE NOTE PROVIDER - PROVIDER TOKENS
PROVIDER:[TOKEN:[[id number]],FOLLOWUP:[2 weeks]] PROVIDER:[TOKEN:[[id number]],FOLLOWUP:[2 weeks]] PROVIDER:[TOKEN:[[id number]],FOLLOWUP:[2 weeks]],PROVIDER:[TOKEN:[[id number]],FOLLOWUP:[2 weeks]] PROVIDER:[TOKEN:[[id number]],FOLLOWUP:[2 weeks]],PROVIDER:[TOKEN:[[id number]],FOLLOWUP:[2 weeks]]

## 2023-12-12 NOTE — DISCHARGE NOTE PROVIDER - NSDCMRMEDTOKEN_GEN_ALL_CORE_FT
aspirin 81 mg oral delayed release tablet: 1 tab(s) orally once a day  atorvastatin 80 mg oral tablet: 1 tab(s) orally once a day (at bedtime)  clopidogrel 75 mg oral tablet: 1 tab(s) orally once a day  metoprolol succinate 25 mg oral tablet, extended release: 1 tab(s) orally once a day

## 2023-12-12 NOTE — DISCHARGE NOTE NURSING/CASE MANAGEMENT/SOCIAL WORK - NSDCPEFALRISK_GEN_ALL_CORE
For information on Fall & Injury Prevention, visit: [email or web address] OR  [email or web address] OR  https://www.cdc.gov/steadi/patient.html For information on Fall & Injury Prevention, visit: [email or web address] OR  [email or web address] OR  https://www.cdc.gov/steadi/patient.html

## 2023-12-12 NOTE — DISCHARGE NOTE PROVIDER - CARE PROVIDER_API CALL
Charli Davis  Interventional Cardiology  [address] [place] [address], [address]  [place] [address]  Phone: [phone]  Fax: [phone]  Follow Up Time: 2 weeks   Charli Davis  Interventional Cardiology  [address] [place] [address], [address]  [place] [address]  Phone: [phone]  Fax: [phone]  Follow Up Time: 2 weeks   Charli Davis  Interventional Cardiology  [address] [place] [address], [address]  [place] [address]  Phone: [phone]  Fax: [phone]  Follow Up Time: 2 weeks    Carlos Mccoy  Internal Medicine  [address] [place] [address]  Phone: [phone]  Fax: [phone]  Follow Up Time: 2 weeks   Charli Davis  Interventional Cardiology  [address] [place] [address], [address]  [place] [address]  Phone: [phone]  Fax: [phone]  Follow Up Time: 2 weeks    Carlos Mccoy  Internal Medicine  [address] [place] [address]  Phone: [phone]  Fax: [phone]  Follow Up Time: 2 weeks

## 2023-12-12 NOTE — DISCHARGE NOTE PROVIDER - NSDCFUADDINST_GEN_ALL_CORE_FT
Avoid lifting heavy weight, keep your wrist wound dry and clean   Do not exert yourself for the first week

## 2023-12-15 DIAGNOSIS — R07.9 CHEST PAIN, UNSPECIFIED: ICD-10-CM

## 2023-12-15 DIAGNOSIS — R00.1 BRADYCARDIA, UNSPECIFIED: ICD-10-CM

## 2023-12-15 DIAGNOSIS — I21.4 NON-ST ELEVATION (NSTEMI) MYOCARDIAL INFARCTION: ICD-10-CM

## 2023-12-15 DIAGNOSIS — I25.10 ATHEROSCLEROTIC HEART DISEASE OF NATIVE CORONARY ARTERY WITHOUT ANGINA PECTORIS: ICD-10-CM

## 2023-12-15 DIAGNOSIS — I25.84 CORONARY ATHEROSCLEROSIS DUE TO CALCIFIED CORONARY LESION: ICD-10-CM

## 2023-12-15 DIAGNOSIS — I10 ESSENTIAL (PRIMARY) HYPERTENSION: ICD-10-CM

## 2024-01-03 ENCOUNTER — APPOINTMENT (OUTPATIENT)
Dept: CARDIOLOGY | Facility: CLINIC | Age: 84
End: 2024-01-03
Payer: MEDICARE

## 2024-01-03 VITALS
BODY MASS INDEX: 21.71 KG/M2 | WEIGHT: 115 LBS | HEIGHT: 61 IN | HEART RATE: 59 BPM | DIASTOLIC BLOOD PRESSURE: 80 MMHG | SYSTOLIC BLOOD PRESSURE: 160 MMHG | OXYGEN SATURATION: 95 %

## 2024-01-03 PROCEDURE — 99214 OFFICE O/P EST MOD 30 MIN: CPT | Mod: 25

## 2024-01-03 PROCEDURE — 93000 ELECTROCARDIOGRAM COMPLETE: CPT

## 2024-01-03 RX ORDER — AMLODIPINE BESYLATE 10 MG/1
10 TABLET ORAL
Refills: 0 | Status: DISCONTINUED | COMMUNITY
End: 2024-01-03

## 2024-01-03 RX ORDER — PANTOPRAZOLE 40 MG/1
40 TABLET, DELAYED RELEASE ORAL DAILY
Qty: 30 | Refills: 0 | Status: DISCONTINUED | COMMUNITY
Start: 2023-12-13 | End: 2024-01-03

## 2024-01-03 RX ORDER — ASPIRIN ENTERIC COATED TABLETS 81 MG 81 MG/1
81 TABLET, DELAYED RELEASE ORAL DAILY
Refills: 0 | Status: ACTIVE | COMMUNITY

## 2024-01-04 NOTE — CARDIOLOGY SUMMARY
[de-identified] : 12/2023 LVEF 64%, basal and mid anteroseptal mild hypokinesis, moderate inferobasal hypokinesis, mildly reduced RV systolic function [de-identified] : 12/2023 Implants:   WING 3.5 x 22  FINDINGS:   Coronary Dominance: Right    LM: Minor luminal irregularities.   LAD:  P-LAD Mild disease.  Mid-LAD moderate disease  Distal LAD Mild disease.  D1 Mild disease.  CX:  P-LCX Mild disease.  Distal LCX: Mild disease.  OM1 Mild disease.  RCA:  P-RCA Mild disease.  Mid- percent occluded s/p PCI    LVEDP:16 mmHg

## 2024-01-04 NOTE — ASSESSMENT
[FreeTextEntry1] : #CAD #ICM #HTN #HLD Since her hospital admission she has been recovering well. I reassured her that she has no functional limitations and encouraged her to increase her physical activity. She has been eager to be more active and I recommended cardiac rehab but at this time she will think about it. I estimate her functional status as NYHA class II and CCS angina class I.   On exam she is warm, well perfused, and euvolemic. She does not require standing diuretics. She reports feeling fatigued since starting metoprolol and I think it is reasonable to decrease her dose in half given she has a preserved systolic function. The data for BB after ACS with normal EF is not entirely robust and so I think it is find to decrease. She will continue aggressive secondary prevention on a high intensity statin. Her BP was elevated in clinic today but she reports that her SBP is normally 120-130.  She is currently on ASA/plavix and will continue for at least the next year. She did report some spotting but no significant bleed. We did discuss that if the bleeding becomes an issue then we can decrease the duration to 6 months.  Plan -Decrease metoprolol XL to 12.5 mg daily -Continue DAPT -Continue atorvastatin 80 mg -RTC in 6 months  Chase Saha MD Interventional Cardiology/Advanced Heart Failure Transplant [facility]

## 2024-01-04 NOTE — HISTORY OF PRESENT ILLNESS
[FreeTextEntry1] : 82 y/o female PMH CAD s/p NSTEMI 12/2023 s/p PCI to mid RCA, HTN who presents to establish care.  Patient admitted from 12/8/2023-12/12/2023 with chest pain and found to have an NSTEMI. Patient underwent cardiac catheterization that demonstrated 100% mid occlusion of RCA s/p PCI x1. Echo with preserved LVEF 64%, WMA in the RCA territory, and mildly reduced RF systolic function.    She presents today for follow up and to establish care. She reports doing well since her hospital admission. She reports no functional limitations but admits to not pushing herself as she did not know if she could or not. Denies dizziness, lightheadedness, syncope, or presyncope. Denies chest discomfort, dyspnea, palpitations. Denies weight gain, edema, PND, orthopnea. Denies abdominal pain, n/v/d/c. Denies fevers/chills, lymph nodes, or skin changes. She has been compliant with her medications but does note new fatigue since being on Metoprolol. She also notes new spotting since being on ASA/plavix. She also reports that her SBP is ~120-130 at home.

## 2024-02-06 NOTE — ASU PREOP CHECKLIST - SITE MARKED BY SURGEON
Ice 20 minutes 4 times a day for pain control.    Take 1000 mg of Tylenol and 600 mg of ibuprofen every 6 hours for pain control.    Follow-up with primary car/sports medicine/your orthopedist in 1 to 2 weeks.  At that time if there is ongoing concern could potentially warrant an outpatient MRI.   in the OR holding yes/in the OR holding

## 2024-02-10 NOTE — PATIENT PROFILE ADULT - FUNCTIONAL SCREEN CURRENT LEVEL: COMMUNICATION, MLM
The patient is a 53y Female complaining of wrist pain/injury. 0 = understands/communicates without difficulty

## 2024-03-23 ENCOUNTER — RX RENEWAL (OUTPATIENT)
Age: 84
End: 2024-03-23

## 2024-03-23 RX ORDER — CLOPIDOGREL BISULFATE 75 MG/1
75 TABLET, FILM COATED ORAL DAILY
Qty: 60 | Refills: 4 | Status: ACTIVE | COMMUNITY
Start: 2024-03-23 | End: 1900-01-01

## 2024-06-12 ENCOUNTER — APPOINTMENT (OUTPATIENT)
Dept: HEART FAILURE | Facility: CLINIC | Age: 84
End: 2024-06-12
Payer: MEDICARE

## 2024-06-12 VITALS
DIASTOLIC BLOOD PRESSURE: 80 MMHG | WEIGHT: 115 LBS | BODY MASS INDEX: 21.71 KG/M2 | HEIGHT: 61 IN | SYSTOLIC BLOOD PRESSURE: 140 MMHG | HEART RATE: 64 BPM

## 2024-06-12 DIAGNOSIS — I25.10 ATHEROSCLEROTIC HEART DISEASE OF NATIVE CORONARY ARTERY W/OUT ANGINA PECTORIS: ICD-10-CM

## 2024-06-12 DIAGNOSIS — I10 ESSENTIAL (PRIMARY) HYPERTENSION: ICD-10-CM

## 2024-06-12 PROCEDURE — 99215 OFFICE O/P EST HI 40 MIN: CPT | Mod: 25

## 2024-06-12 PROCEDURE — 93000 ELECTROCARDIOGRAM COMPLETE: CPT

## 2024-06-12 RX ORDER — LOSARTAN POTASSIUM 50 MG/1
50 TABLET, FILM COATED ORAL DAILY
Qty: 90 | Refills: 3 | Status: ACTIVE | COMMUNITY
Start: 2024-06-12

## 2024-06-12 RX ORDER — METOPROLOL SUCCINATE 25 MG/1
25 TABLET, EXTENDED RELEASE ORAL DAILY
Qty: 45 | Refills: 3 | Status: DISCONTINUED | COMMUNITY
End: 2024-06-12

## 2024-06-12 RX ORDER — ATORVASTATIN CALCIUM 40 MG/1
40 TABLET, FILM COATED ORAL DAILY
Qty: 90 | Refills: 3 | Status: ACTIVE | COMMUNITY
Start: 2024-03-23

## 2024-06-12 NOTE — CARDIOLOGY SUMMARY
[de-identified] : NSR HR 64, q waves inferiorly [de-identified] : TTE 12/2023 LVEF 64%, basal and mid anteroseptal mild hypokinesis, moderate inferobasal hypokinesis, mildly reduced RV systolic function  [de-identified] : 12/2023 Implants:  WING 3.5 x 22 LM: Minor luminal irregularities. LAD: P-LAD Mild disease. Mid-LAD moderate disease Distal LAD Mild disease. D1 Mild disease. CX: P-LCX Mild disease. Distal LCX: Mild disease. OM1 Mild disease. RCA: P-RCA Mild disease. Mid- percent occluded s/p PCI  LVEDP:16 mmHg

## 2024-06-12 NOTE — HISTORY OF PRESENT ILLNESS
[FreeTextEntry1] : 82 y/o female PMH CAD s/p NSTEMI 12/2023 s/p PCI to mid RCA, HTN who presents to \[facility]\"" care.  Patient admitted from 12/8/2023-12/12/2023 with chest pain and found to have an NSTEMI. Patient underwent cardiac catheterization that demonstrated 100% mid occlusion of RCA s/p PCI x1. Echo with preserved LVEF 64%, WMA in the RCA territory, and mildly reduced RF systolic function.  Initial visit 1/3/24. She reports doing well since her hospital admission. She reports no functional limitations but admits to not pushing herself as she did not know if she could or not. She has been compliant with her medications but does note new fatigue since being on Metoprolol. She also notes new spotting since being on ASA/plavix. She also reports that her SBP is ~120-130 at home. Metoprolol decreased to 12.5 mg at end.   Clinic visit 6/12/24 for follow up. She reports fatigue has improved on lower dose metoprolol. Recently she has noticed elevated BP as high as SBP 190s. Her PCP started her on losartan 25 mg daily and her SBP has now been in the 140s. She also reported muscle weakness and had halved atorvastatin that improved her symptoms. She occasionally describes vague chest pain that may be associated with carrying heavy objects. She reports no functional limitations. Denies fatigue, dizziness, lightheadedness, syncope, or presyncope. Denies dyspnea or palpitations. Denies weight gain, edema, PND, orthopnea. Denies abdominal pain, n/v/d/c. Denies fevers/chills, lymph nodes, or skin changes. No recent hospitalizations.

## 2024-06-12 NOTE — ASSESSMENT
[FreeTextEntry1] : #CAD #ICM #HTN #HLD Overall no significant change in functional status and she has stable CCS angina class II. We discussed importance of secondary prevention, and she recently was started on losartan for HTN.   On exam she is warm, well perfused, and euvolemic. She does not require standing diuretics. BP still elevated on losartan 25 mg daily. Goal <130. I will increase losartan to 50 mg daily. She has a normal LV function and so will stop BB as data for BB in stable ischemic heart disease with normal LV function is likely not beneficial (REDUCE-AMI). Her LDL is well controlled <25 and given muscle pain/weakness I think it is reasonable to decrease atorvastatin to 40 mg as she does report improvement in symptoms after decreasing the dose.  She is currently on ASA/plavix and will continue for at least the next year.   Plan -Stop metoprolol -Decrease atorvastatin 40 mg daily -Increase losartan 50 mg daily -Continue ASA/Plavix for at least 1 year, December 2024 -RTC in 6 months  Chase Saha MD Interventional Cardiology/Advanced Heart Failure Transplant [facility]

## 2024-11-19 NOTE — PATIENT PROFILE ADULT - NSPROPTRIGHTNOTIFY_GEN_A_NUR
Detail Level: Detailed
Patient Specific Counseling (Will Not Stick From Patient To Patient): Dr. Washington notes the nava could be triggering the eczema
declines

## 2024-11-25 ENCOUNTER — APPOINTMENT (OUTPATIENT)
Dept: HEART FAILURE | Facility: CLINIC | Age: 84
End: 2024-11-25
Payer: MEDICARE

## 2024-11-25 VITALS
HEIGHT: 61 IN | BODY MASS INDEX: 21.52 KG/M2 | DIASTOLIC BLOOD PRESSURE: 66 MMHG | WEIGHT: 114 LBS | HEART RATE: 92 BPM | OXYGEN SATURATION: 95 % | SYSTOLIC BLOOD PRESSURE: 130 MMHG

## 2024-11-25 DIAGNOSIS — I10 ESSENTIAL (PRIMARY) HYPERTENSION: ICD-10-CM

## 2024-11-25 DIAGNOSIS — I25.10 ATHEROSCLEROTIC HEART DISEASE OF NATIVE CORONARY ARTERY W/OUT ANGINA PECTORIS: ICD-10-CM

## 2024-11-25 PROCEDURE — 99215 OFFICE O/P EST HI 40 MIN: CPT | Mod: 25

## 2024-11-25 PROCEDURE — 93000 ELECTROCARDIOGRAM COMPLETE: CPT

## 2024-11-25 RX ORDER — AMLODIPINE BESYLATE 5 MG/1
5 TABLET ORAL DAILY
Refills: 0 | Status: ACTIVE | COMMUNITY
Start: 2024-11-25

## 2025-01-24 ENCOUNTER — LABORATORY RESULT (OUTPATIENT)
Age: 85
End: 2025-01-24

## 2025-01-24 ENCOUNTER — APPOINTMENT (OUTPATIENT)
Dept: UROLOGY | Facility: CLINIC | Age: 85
End: 2025-01-24
Payer: MEDICARE

## 2025-01-24 PROCEDURE — 99204 OFFICE O/P NEW MOD 45 MIN: CPT

## 2025-01-24 RX ORDER — SULFAMETHOXAZOLE AND TRIMETHOPRIM 800; 160 MG/1; MG/1
800-160 TABLET ORAL TWICE DAILY
Qty: 10 | Refills: 0 | Status: ACTIVE | COMMUNITY
Start: 2025-01-24 | End: 1900-01-01

## 2025-01-28 LAB
APPEARANCE: ABNORMAL
BACTERIA UR CULT: ABNORMAL
BILIRUBIN URINE: NEGATIVE
BLOOD URINE: ABNORMAL
COLOR: YELLOW
GLUCOSE QUALITATIVE U: NEGATIVE MG/DL
KETONES URINE: NEGATIVE MG/DL
LEUKOCYTE ESTERASE URINE: ABNORMAL
NITRITE URINE: POSITIVE
PH URINE: 6
PROTEIN URINE: 100 MG/DL
SPECIFIC GRAVITY URINE: 1.02
UROBILINOGEN URINE: 0.2 MG/DL

## 2025-01-31 ENCOUNTER — APPOINTMENT (OUTPATIENT)
Dept: UROLOGY | Facility: CLINIC | Age: 85
End: 2025-01-31
Payer: MEDICARE

## 2025-01-31 DIAGNOSIS — N39.0 URINARY TRACT INFECTION, SITE NOT SPECIFIED: ICD-10-CM

## 2025-01-31 DIAGNOSIS — Z87.898 PERSONAL HISTORY OF OTHER SPECIFIED CONDITIONS: ICD-10-CM

## 2025-01-31 DIAGNOSIS — R39.9 UNSPECIFIED SYMPTOMS AND SIGNS INVOLVING THE GENITOURINARY SYSTEM: ICD-10-CM

## 2025-01-31 PROCEDURE — G2211 COMPLEX E/M VISIT ADD ON: CPT | Mod: NC

## 2025-01-31 PROCEDURE — 99213 OFFICE O/P EST LOW 20 MIN: CPT | Mod: 93

## 2025-01-31 RX ORDER — SULFAMETHOXAZOLE AND TRIMETHOPRIM 800; 160 MG/1; MG/1
800-160 TABLET ORAL TWICE DAILY
Qty: 6 | Refills: 0 | Status: ACTIVE | COMMUNITY
Start: 2025-01-31 | End: 1900-01-01

## 2025-04-02 ENCOUNTER — NON-APPOINTMENT (OUTPATIENT)
Age: 85
End: 2025-04-02

## 2025-04-02 ENCOUNTER — APPOINTMENT (OUTPATIENT)
Dept: HEART FAILURE | Facility: CLINIC | Age: 85
End: 2025-04-02
Payer: MEDICARE

## 2025-04-02 VITALS
OXYGEN SATURATION: 95 % | DIASTOLIC BLOOD PRESSURE: 82 MMHG | BODY MASS INDEX: 21.52 KG/M2 | HEART RATE: 80 BPM | HEIGHT: 61 IN | WEIGHT: 114 LBS | SYSTOLIC BLOOD PRESSURE: 144 MMHG

## 2025-04-02 DIAGNOSIS — I10 ESSENTIAL (PRIMARY) HYPERTENSION: ICD-10-CM

## 2025-04-02 DIAGNOSIS — I25.10 ATHEROSCLEROTIC HEART DISEASE OF NATIVE CORONARY ARTERY W/OUT ANGINA PECTORIS: ICD-10-CM

## 2025-04-02 PROCEDURE — 99214 OFFICE O/P EST MOD 30 MIN: CPT | Mod: 25

## 2025-04-02 PROCEDURE — 93000 ELECTROCARDIOGRAM COMPLETE: CPT

## 2025-04-25 ENCOUNTER — APPOINTMENT (OUTPATIENT)
Facility: CLINIC | Age: 85
End: 2025-04-25

## 2025-04-25 DIAGNOSIS — M65.312 TRIGGER THUMB, LEFT THUMB: ICD-10-CM

## 2025-04-25 PROCEDURE — 20550 NJX 1 TENDON SHEATH/LIGAMENT: CPT | Mod: LT

## 2025-04-25 PROCEDURE — 99203 OFFICE O/P NEW LOW 30 MIN: CPT | Mod: 25

## 2025-04-29 VITALS — WEIGHT: 115 LBS | HEIGHT: 61 IN | BODY MASS INDEX: 21.71 KG/M2

## 2025-05-14 ENCOUNTER — APPOINTMENT (OUTPATIENT)
Dept: UROLOGY | Facility: CLINIC | Age: 85
End: 2025-05-14
Payer: MEDICARE

## 2025-05-14 ENCOUNTER — LABORATORY RESULT (OUTPATIENT)
Age: 85
End: 2025-05-14

## 2025-05-14 DIAGNOSIS — R39.9 UNSPECIFIED SYMPTOMS AND SIGNS INVOLVING THE GENITOURINARY SYSTEM: ICD-10-CM

## 2025-05-14 DIAGNOSIS — R82.90 UNSPECIFIED ABNORMAL FINDINGS IN URINE: ICD-10-CM

## 2025-05-14 DIAGNOSIS — N39.0 URINARY TRACT INFECTION, SITE NOT SPECIFIED: ICD-10-CM

## 2025-05-14 PROCEDURE — 99214 OFFICE O/P EST MOD 30 MIN: CPT

## 2025-05-14 PROCEDURE — G2211 COMPLEX E/M VISIT ADD ON: CPT

## 2025-05-14 RX ORDER — SULFAMETHOXAZOLE AND TRIMETHOPRIM 800; 160 MG/1; MG/1
800-160 TABLET ORAL
Qty: 10 | Refills: 0 | Status: ACTIVE | COMMUNITY
Start: 2025-05-14 | End: 1900-01-01

## 2025-05-21 RX ORDER — AMOXICILLIN AND CLAVULANATE POTASSIUM 500; 125 MG/1; MG/1
500-125 TABLET, FILM COATED ORAL
Qty: 10 | Refills: 0 | Status: ACTIVE | COMMUNITY
Start: 2025-05-21 | End: 1900-01-01

## 2025-05-28 ENCOUNTER — APPOINTMENT (OUTPATIENT)
Dept: ORTHOPEDIC SURGERY | Facility: CLINIC | Age: 85
End: 2025-05-28

## 2025-07-10 NOTE — ED ADULT NURSE NOTE - NS ED NURSE LEVEL OF CONSCIOUSNESS ORIENTATION
Hallucination - audio/Oriented - self; Oriented - place; Oriented - time
Labs unremarkable. F/u outpatient as needed

## 2025-09-17 ENCOUNTER — APPOINTMENT (OUTPATIENT)
Dept: UROLOGY | Facility: CLINIC | Age: 85
End: 2025-09-17